# Patient Record
Sex: FEMALE | Race: BLACK OR AFRICAN AMERICAN | NOT HISPANIC OR LATINO | URBAN - METROPOLITAN AREA
[De-identification: names, ages, dates, MRNs, and addresses within clinical notes are randomized per-mention and may not be internally consistent; named-entity substitution may affect disease eponyms.]

---

## 2023-05-29 ENCOUNTER — INPATIENT (INPATIENT)
Facility: HOSPITAL | Age: 58
LOS: 2 days | Discharge: HOME | DRG: 101 | End: 2023-06-01
Attending: PSYCHIATRY & NEUROLOGY | Admitting: STUDENT IN AN ORGANIZED HEALTH CARE EDUCATION/TRAINING PROGRAM
Payer: MEDICAID

## 2023-05-29 VITALS
HEART RATE: 74 BPM | SYSTOLIC BLOOD PRESSURE: 124 MMHG | DIASTOLIC BLOOD PRESSURE: 84 MMHG | RESPIRATION RATE: 20 BRPM | OXYGEN SATURATION: 97 % | TEMPERATURE: 98 F | WEIGHT: 138.89 LBS

## 2023-05-29 DIAGNOSIS — E87.6 HYPOKALEMIA: ICD-10-CM

## 2023-05-29 DIAGNOSIS — F17.210 NICOTINE DEPENDENCE, CIGARETTES, UNCOMPLICATED: ICD-10-CM

## 2023-05-29 DIAGNOSIS — Z29.9 ENCOUNTER FOR PROPHYLACTIC MEASURES, UNSPECIFIED: ICD-10-CM

## 2023-05-29 DIAGNOSIS — J45.909 UNSPECIFIED ASTHMA, UNCOMPLICATED: ICD-10-CM

## 2023-05-29 DIAGNOSIS — I12.9 HYPERTENSIVE CHRONIC KIDNEY DISEASE WITH STAGE 1 THROUGH STAGE 4 CHRONIC KIDNEY DISEASE, OR UNSPECIFIED CHRONIC KIDNEY DISEASE: ICD-10-CM

## 2023-05-29 DIAGNOSIS — I10 ESSENTIAL (PRIMARY) HYPERTENSION: ICD-10-CM

## 2023-05-29 DIAGNOSIS — G40.909 EPILEPSY, UNSPECIFIED, NOT INTRACTABLE, WITHOUT STATUS EPILEPTICUS: ICD-10-CM

## 2023-05-29 DIAGNOSIS — R56.9 UNSPECIFIED CONVULSIONS: ICD-10-CM

## 2023-05-29 DIAGNOSIS — N18.30 CHRONIC KIDNEY DISEASE, STAGE 3 UNSPECIFIED: ICD-10-CM

## 2023-05-29 DIAGNOSIS — R00.1 BRADYCARDIA, UNSPECIFIED: ICD-10-CM

## 2023-05-29 LAB
ALBUMIN SERPL ELPH-MCNC: 3.3 G/DL — LOW (ref 3.4–5)
ALP SERPL-CCNC: 110 U/L — SIGNIFICANT CHANGE UP (ref 40–120)
ALT FLD-CCNC: 32 U/L — SIGNIFICANT CHANGE UP (ref 12–42)
ANION GAP SERPL CALC-SCNC: 4 MMOL/L — LOW (ref 9–16)
ANION GAP SERPL CALC-SCNC: 6 MMOL/L — LOW (ref 9–16)
AST SERPL-CCNC: 46 U/L — HIGH (ref 15–37)
BASOPHILS # BLD AUTO: 0.05 K/UL — SIGNIFICANT CHANGE UP (ref 0–0.2)
BASOPHILS NFR BLD AUTO: 0.9 % — SIGNIFICANT CHANGE UP (ref 0–2)
BILIRUB SERPL-MCNC: 0.6 MG/DL — SIGNIFICANT CHANGE UP (ref 0.2–1.2)
BUN SERPL-MCNC: 17 MG/DL — SIGNIFICANT CHANGE UP (ref 7–23)
BUN SERPL-MCNC: 18 MG/DL — SIGNIFICANT CHANGE UP (ref 7–23)
CALCIUM SERPL-MCNC: 8.9 MG/DL — SIGNIFICANT CHANGE UP (ref 8.5–10.5)
CALCIUM SERPL-MCNC: 9 MG/DL — SIGNIFICANT CHANGE UP (ref 8.5–10.5)
CHLORIDE SERPL-SCNC: 108 MMOL/L — SIGNIFICANT CHANGE UP (ref 96–108)
CHLORIDE SERPL-SCNC: 109 MMOL/L — HIGH (ref 96–108)
CO2 SERPL-SCNC: 32 MMOL/L — HIGH (ref 22–31)
CO2 SERPL-SCNC: 32 MMOL/L — HIGH (ref 22–31)
CREAT SERPL-MCNC: 1.05 MG/DL — SIGNIFICANT CHANGE UP (ref 0.5–1.3)
CREAT SERPL-MCNC: 1.21 MG/DL — SIGNIFICANT CHANGE UP (ref 0.5–1.3)
EGFR: 53 ML/MIN/1.73M2 — LOW
EGFR: 63 ML/MIN/1.73M2 — SIGNIFICANT CHANGE UP
EOSINOPHIL # BLD AUTO: 0.14 K/UL — SIGNIFICANT CHANGE UP (ref 0–0.5)
EOSINOPHIL NFR BLD AUTO: 2.4 % — SIGNIFICANT CHANGE UP (ref 0–6)
GLUCOSE SERPL-MCNC: 87 MG/DL — SIGNIFICANT CHANGE UP (ref 70–99)
GLUCOSE SERPL-MCNC: 94 MG/DL — SIGNIFICANT CHANGE UP (ref 70–99)
HCT VFR BLD CALC: 39.7 % — SIGNIFICANT CHANGE UP (ref 34.5–45)
HGB BLD-MCNC: 12.6 G/DL — SIGNIFICANT CHANGE UP (ref 11.5–15.5)
HIV 1 & 2 AB SERPL IA.RAPID: SIGNIFICANT CHANGE UP
IMM GRANULOCYTES NFR BLD AUTO: 0.2 % — SIGNIFICANT CHANGE UP (ref 0–0.9)
LACTATE SERPL-SCNC: 1 MMOL/L — SIGNIFICANT CHANGE UP (ref 0.5–2)
LYMPHOCYTES # BLD AUTO: 3.07 K/UL — SIGNIFICANT CHANGE UP (ref 1–3.3)
LYMPHOCYTES # BLD AUTO: 52.2 % — HIGH (ref 13–44)
MAGNESIUM SERPL-MCNC: 1.8 MG/DL — SIGNIFICANT CHANGE UP (ref 1.6–2.6)
MCHC RBC-ENTMCNC: 28.7 PG — SIGNIFICANT CHANGE UP (ref 27–34)
MCHC RBC-ENTMCNC: 31.7 GM/DL — LOW (ref 32–36)
MCV RBC AUTO: 90.4 FL — SIGNIFICANT CHANGE UP (ref 80–100)
MONOCYTES # BLD AUTO: 0.33 K/UL — SIGNIFICANT CHANGE UP (ref 0–0.9)
MONOCYTES NFR BLD AUTO: 5.6 % — SIGNIFICANT CHANGE UP (ref 2–14)
NEUTROPHILS # BLD AUTO: 2.28 K/UL — SIGNIFICANT CHANGE UP (ref 1.8–7.4)
NEUTROPHILS NFR BLD AUTO: 38.7 % — LOW (ref 43–77)
NRBC # BLD: 0 /100 WBCS — SIGNIFICANT CHANGE UP (ref 0–0)
PLATELET # BLD AUTO: 175 K/UL — SIGNIFICANT CHANGE UP (ref 150–400)
POTASSIUM SERPL-MCNC: 3.2 MMOL/L — LOW (ref 3.5–5.3)
POTASSIUM SERPL-MCNC: SIGNIFICANT CHANGE UP MMOL/L (ref 3.5–5.3)
POTASSIUM SERPL-SCNC: 3.2 MMOL/L — LOW (ref 3.5–5.3)
POTASSIUM SERPL-SCNC: SIGNIFICANT CHANGE UP MMOL/L (ref 3.5–5.3)
PROT SERPL-MCNC: 7.4 G/DL — SIGNIFICANT CHANGE UP (ref 6.4–8.2)
RBC # BLD: 4.39 M/UL — SIGNIFICANT CHANGE UP (ref 3.8–5.2)
RBC # FLD: 13.7 % — SIGNIFICANT CHANGE UP (ref 10.3–14.5)
SODIUM SERPL-SCNC: 144 MMOL/L — SIGNIFICANT CHANGE UP (ref 132–145)
SODIUM SERPL-SCNC: 147 MMOL/L — HIGH (ref 132–145)
TSH SERPL-MCNC: 1.87 UIU/ML — SIGNIFICANT CHANGE UP (ref 0.36–3.74)
WBC # BLD: 5.88 K/UL — SIGNIFICANT CHANGE UP (ref 3.8–10.5)
WBC # FLD AUTO: 5.88 K/UL — SIGNIFICANT CHANGE UP (ref 3.8–10.5)

## 2023-05-29 PROCEDURE — 99222 1ST HOSP IP/OBS MODERATE 55: CPT

## 2023-05-29 PROCEDURE — 99285 EMERGENCY DEPT VISIT HI MDM: CPT

## 2023-05-29 RX ORDER — IPRATROPIUM/ALBUTEROL SULFATE 18-103MCG
3 AEROSOL WITH ADAPTER (GRAM) INHALATION ONCE
Refills: 0 | Status: COMPLETED | OUTPATIENT
Start: 2023-05-29 | End: 2023-05-29

## 2023-05-29 RX ORDER — SODIUM CHLORIDE 9 MG/ML
1000 INJECTION INTRAMUSCULAR; INTRAVENOUS; SUBCUTANEOUS ONCE
Refills: 0 | Status: COMPLETED | OUTPATIENT
Start: 2023-05-29 | End: 2023-05-29

## 2023-05-29 RX ORDER — ENOXAPARIN SODIUM 100 MG/ML
40 INJECTION SUBCUTANEOUS EVERY 24 HOURS
Refills: 0 | Status: DISCONTINUED | OUTPATIENT
Start: 2023-05-29 | End: 2023-06-01

## 2023-05-29 RX ORDER — IPRATROPIUM/ALBUTEROL SULFATE 18-103MCG
3 AEROSOL WITH ADAPTER (GRAM) INHALATION EVERY 6 HOURS
Refills: 0 | Status: DISCONTINUED | OUTPATIENT
Start: 2023-05-29 | End: 2023-06-01

## 2023-05-29 RX ORDER — SODIUM CHLORIDE 9 MG/ML
1000 INJECTION, SOLUTION INTRAVENOUS
Refills: 0 | Status: DISCONTINUED | OUTPATIENT
Start: 2023-05-29 | End: 2023-05-31

## 2023-05-29 RX ORDER — ACETAMINOPHEN 500 MG
650 TABLET ORAL EVERY 6 HOURS
Refills: 0 | Status: DISCONTINUED | OUTPATIENT
Start: 2023-05-29 | End: 2023-06-01

## 2023-05-29 RX ORDER — POTASSIUM CHLORIDE 20 MEQ
40 PACKET (EA) ORAL EVERY 4 HOURS
Refills: 0 | Status: COMPLETED | OUTPATIENT
Start: 2023-05-29 | End: 2023-05-29

## 2023-05-29 RX ADMIN — Medication 3 MILLILITER(S): at 11:27

## 2023-05-29 RX ADMIN — Medication 40 MILLIEQUIVALENT(S): at 17:02

## 2023-05-29 RX ADMIN — ENOXAPARIN SODIUM 40 MILLIGRAM(S): 100 INJECTION SUBCUTANEOUS at 13:16

## 2023-05-29 RX ADMIN — SODIUM CHLORIDE 1000 MILLILITER(S): 9 INJECTION, SOLUTION INTRAVENOUS at 13:16

## 2023-05-29 RX ADMIN — Medication 40 MILLIEQUIVALENT(S): at 13:16

## 2023-05-29 RX ADMIN — SODIUM CHLORIDE 1000 MILLILITER(S): 9 INJECTION INTRAMUSCULAR; INTRAVENOUS; SUBCUTANEOUS at 08:27

## 2023-05-29 NOTE — ED ADULT NURSE NOTE - NS ED PATIENT SAFETY CONCERN
Unable to assess due to medical condition Meals and Snack/Nutrition Education/Collaboration and Referral of Nutrition Care

## 2023-05-29 NOTE — H&P ADULT - PROBLEM SELECTOR PLAN 1
repots hx of seizures does not know if she is currently rx meds or last time she took AEDs  - vEEG  - seizure precautions  - aspiration precautions   - Ativan for seizure> 2 min  - med rec required- pt does not know name of pharmacy

## 2023-05-29 NOTE — H&P ADULT - HISTORY OF PRESENT ILLNESS
57F PMHx HTN, Asthma, Seizures presents from [facility]-in [facility] brought in by ems after witnessed GTC-like seizure. Initially EMS called for difficulty breathing after cleaning spray triggered her to have mild difficulty breathing, but after EMS arrived she began to seize. 5mg IV midazolam was given and the seizure broke and patient was able to tell EMS that she has a history of seizures, but was not able to tell them if she is prescribed any medications for them. The staff at the drop-in [facility] were unable to provide other information. Pt knows that she is rx blood pressure meds but does not always take them, she does not know the name. Pt states she may be prescribed seizure and asthma meds but does not know last time she took them. Pt reports that she does not know the last time she was hospitalized Pt denies urinary or bowel incontence, tongue biting, falling or hitting her head. Further deines fever, chills, gait instabiliy numbness or tingling of lower extremities.

## 2023-05-29 NOTE — ED PROVIDER NOTE - CLINICAL SUMMARY MEDICAL DECISION MAKING FREE TEXT BOX
57F history of seizures biba after witnessed GTC-like seizure. Patient coming from shelter where she was staying. Initially, EMS called for difficulty breathing after cleaning spray triggered her to have mild difficulty breathing, but after EMS arrived she began to seize. 5mg IV midazolam was given and the seizure broke and patient was able to tell EMS that she has a history of seizures, but was not able to tell them if she is prescribed any medications for them. The staff at the shelter were unable to provide other information.     PE: Patient is lethargic but responsive to voice, appears postictal/sedated from benzo. Pupils 2mm reactive and round. NCAT. MMM. CTAB, regular rate. RRR. Abd soft nondistended. No external signs of trauma noted to the head or extremities.     MDM: Patient biba after witnessed seizure, no sp midazolam and postictal/sedated. No signs of trauma. May have been triggered by stress. CTAB now. Will reassess once patient able to interact and provide further history. Crack pipe found on patient.

## 2023-05-29 NOTE — ED PROVIDER NOTE - OBJECTIVE STATEMENT
57F history of seizures biba after witnessed GTC-like seizure. Patient coming from shelter where she was staying. Initially, EMS called for difficulty breathing after cleaning spray triggered her to have mild difficulty breathing, but after EMS arrived she began to seize. 5mg IV midazolam was given and the seizure broke and patient was able to tell EMS that she has a history of seizures, but was not able to tell them if she is prescribed any medications for them. The staff at the shelter were unable to provide other information.

## 2023-05-29 NOTE — H&P ADULT - NSHPPHYSICALEXAM_GEN_ALL_CORE
.  VITAL SIGNS:  T(C): 37.1 (05-29-23 @ 08:29), Max: 37.1 (05-29-23 @ 08:29)  T(F): 98.7 (05-29-23 @ 08:29), Max: 98.7 (05-29-23 @ 08:29)  HR: 60 (05-29-23 @ 08:29) (60 - 74)  BP: 153/100 (05-29-23 @ 08:29) (124/84 - 153/100)  BP(mean): --  RR: 16 (05-29-23 @ 08:29) (16 - 20)  SpO2: 97% (05-29-23 @ 08:29) (96% - 97%)  Wt(kg): --    PHYSICAL EXAM:    Constitutional: laying in bed  Head: NC/AT  Eyes: PERRL, EOMI, anicteric sclera  ENT: no nasal discharge; uvula midline, no oropharyngeal erythema or exudates; MMM  Neck: supple; no JVD or thyromegaly  Respiratory: CTA B/L; no W/R/R, no retractions  Cardiac: +S1/S2; RRR; no M/R/G; PMI non-displaced  Gastrointestinal: soft, NT/ND; no rebound or guarding; +BSx4  Back: spine midline, no bony tenderness or step-offs; no CVAT B/L  Extremities: WWP, no clubbing or cyanosis; no peripheral edema. Capillary refill <2 sec  Musculoskeletal: NROM x4; no joint swelling, tenderness or erythema  Vascular: 2+ radial, femoral, DP/PT pulses B/L  Dermatologic: skin warm, dry and intact; no rashes, wounds, or scars  Lymphatic: no submandibular or cervical LAD  Neurologic: AAOx3; CNII-XII grossly intact; no focal deficits  Psychiatric: affect and characteristics of appearance, verbalizations, behaviors are appropriate .  VITAL SIGNS:  T(C): 37.1 (05-29-23 @ 08:29), Max: 37.1 (05-29-23 @ 08:29)  T(F): 98.7 (05-29-23 @ 08:29), Max: 98.7 (05-29-23 @ 08:29)  HR: 60 (05-29-23 @ 08:29) (60 - 74)  BP: 153/100 (05-29-23 @ 08:29) (124/84 - 153/100)  BP(mean): --  RR: 16 (05-29-23 @ 08:29) (16 - 20)  SpO2: 97% (05-29-23 @ 08:29) (96% - 97%)  Wt(kg): --    PHYSICAL EXAM:    Constitutional: laying in bed  Head: NC/AT  Eyes: PERRL, EOMI, anicteric sclera  ENT: no nasal discharge; uvula midline, no oropharyngeal erythema or exudates; dry mucous membranes  Neck: supple; no JVD or thyromegaly  Respiratory: CTA B/L; no W/R/R, no retractions  Cardiac: +S1/S2; RRR; no M/R/G; PMI non-displaced  Gastrointestinal: soft, NT/ND; no rebound or guarding; +BSx4  Back: spine midline, no bony tenderness or step-offs; no CVAT B/L  Extremities: WWP, no clubbing or cyanosis; no peripheral edema. Capillary refill <2 sec  Musculoskeletal: NROM x4; no joint swelling, tenderness or erythema  Vascular: 2+ radial, femoral, DP/PT pulses B/L  Dermatologic: skin warm, dry and intact; no rashes, wounds, or scars  Lymphatic: no submandibular or cervical LAD  Neurologic: AAOx3; CNII-XII grossly intact; no focal deficits  Psychiatric: affect and characteristics of appearance, verbalizations, behaviors are appropriate

## 2023-05-29 NOTE — H&P ADULT - ASSESSMENT
57F PMHx HTN, Asthma, Seizures presents from half-way for witnessed seizure at half-way likely attributed to medication non-adherence.

## 2023-05-29 NOTE — H&P ADULT - PROBLEM SELECTOR PLAN 2
reports hx of asthma, may be COPD vs mixed as pt has 25pack yr hx   - was given duoneb x1  - duonebs prn sob/wheezing  - med rec required- pt does not know name of pharmacy reports hx of asthma, may be COPD vs mixed as pt has 25pack yr hx espcially considering HCO3 of 32 potential metabolic compensation  - was given duoneb x1  - duonebs prn sob/wheezing  - med rec required- pt does not know name of pharmacy

## 2023-05-29 NOTE — H&P ADULT - NSHPLABSRESULTS_GEN_ALL_CORE
LABS:                         12.6   5.88  )-----------( 175      ( 29 May 2023 07:01 )             39.7     05-29    147<H>  |  109<H>  |  18  ----------------------------<  94  3.2<L>   |  32<H>  |  1.21    Ca    9.0      29 May 2023 08:01  Mg     1.8     05-29    TPro  7.4  /  Alb  3.3<L>  /  TBili  0.6  /  DBili  x   /  AST  46<H>  /  ALT  32  /  AlkPhos  110  05-29                  RADIOLOGY, EKG & ADDITIONAL TESTS:

## 2023-05-29 NOTE — H&P ADULT - PROBLEM SELECTOR PLAN 3
reports hx of chronic stable HTN, does not know name of meds   - med rec required -  pt does not know name of pharmacy

## 2023-05-29 NOTE — ED PROVIDER NOTE - CARE PROVIDER_API CALL
Dana Bryan  Neurology  [address] [place] [address]  Phone: [phone]  Fax: [phone]  Follow Up Time: Routine

## 2023-05-29 NOTE — ED PROVIDER NOTE - NSFOLLOWUPINSTRUCTIONS_ED_ALL_ED_FT
Please follow up with the provider listed in the discharge papers. Be sure to look out for warnings or signs of seizure such as tongue biting, incontinence, shaking, eye rolling/ shaking, generalized shaking, twitching, and acute confusion or amnesia. If these things happen, please alert a healthcare professional immediately and/or come to the ER.     -Please seek medical attention if seizure lasts > 2 minutes, loss of consciousness, altered mental status, persistent headache or lethargy, change in seizure activity or any new or worsening medical conditions  -Activities such as swimming, bathing, outdoor activities, and sports should be done under supervision.    Please follow up with your Primary Care Physician. If you do not have a doctor, you can call our referral line ([phone]) to find a doctor that matches your insurance.     You can also follow up with clinics listed below if you do not have a doctor:  [facility]  [address] [place] [address]  Appointment Center:  [phone]    [address] [address]  Appointment Center: [phone] ([phone])

## 2023-05-29 NOTE — ED PROVIDER NOTE - PHYSICAL EXAMINATION
PE: Patient is lethargic but responsive to voice, appears postictal/sedated from benzo. Pupils 2mm reactive and round. NCAT. MMM. CTAB, regular rate. RRR. Abd soft nondistended. No external signs of trauma noted to the head or extremities.

## 2023-05-29 NOTE — ED ADULT NURSE NOTE - NSFALLRISKINTERV_ED_ALL_ED
Assistance OOB with selected safe patient handling equipment if applicable/Communicate fall risk and risk factors to all staff, patient, and family/Encourage patient to sit up slowly, dangle for a short time, stand at bedside before walking/Orthostatic vital signs/Provide visual cue: yellow wristband, yellow gown, etc/Reinforce activity limits and safety measures with patient and family/Review medications for side effects contributing to fall risk/Toileting schedule using arm’s reach rule for commode and bathroom/Call bell, personal items and telephone in reach/Instruct patient to call for assistance before getting out of bed/chair/stretcher/Non-slip footwear applied when patient is off stretcher/[place] to call system/Physically safe environment - no spills, clutter or unnecessary equipment/Purposeful Proactive Rounding/Room/bathroom lighting operational, light cord in reach

## 2023-05-29 NOTE — PATIENT PROFILE ADULT - FALL HARM RISK - HARM RISK INTERVENTIONS

## 2023-05-29 NOTE — H&P ADULT - ATTENDING COMMENTS
Decreased ADLs secondary to right total hip arthroplasty 57-year-old woman with history of seizures and asthma who yesterday had difficulty breathing while at a drop in shelter.  EMS was called for suspected asthma exacerbation and while they were there she had witnessed seizure-like activity.  She was brought to [facility] ED and then admitted to EMU.  She had another episode this morning of seizure-like activity, with rapid return to baseline.  Lactate 1.0 shortly after the episode.  Differential includes epilepsy vs. PNES.  EEG pending.

## 2023-05-29 NOTE — H&P ADULT - PROBLEM SELECTOR PLAN 4
F: none  E: replete prn  N: low sodium   DVT ppx: lovenox F: LR 100mL/hr 10 hrs  E: replete prn  N: low sodium   DVT ppx: lovenox

## 2023-05-29 NOTE — ED ADULT NURSE REASSESSMENT NOTE - NS ED NURSE REASSESS COMMENT FT1
Pt now awake, A+Ox4, able to follow commands. Awaiting transport to [facility] for admission.

## 2023-05-29 NOTE — ED ADULT TRIAGE NOTE - CHIEF COMPLAINT QUOTE
BIBA from shelter? [address], with complaints of seizure. Patient initially had staff call 911 when she began experiencing asthma symptoms after they spray lysol in her vicinity. Upon ems arrival patient began experiencing convulsive type movements of arms and legs, 5mg versed administered ivp by medic. No signs of injury noted. Patient sedated on arrival to ED.

## 2023-05-29 NOTE — ED ADULT NURSE REASSESSMENT NOTE - NS ED NURSE REASSESS COMMENT FT1
pt rec'd awake and alert- 2nd SL in place, pt on cardiac monitor and with no complaints. Comfort measures given, safety precautions in place- repeat VSS

## 2023-05-29 NOTE — ED ADULT TRIAGE NOTE - NS ED TRIAGE AVPU SCALE
Unresponsive - The patient is nonverbal and does not respond even when a painful stimulus is applied. 29

## 2023-05-30 LAB
ANION GAP SERPL CALC-SCNC: 12 MMOL/L — SIGNIFICANT CHANGE UP (ref 5–17)
BUN SERPL-MCNC: 14 MG/DL — SIGNIFICANT CHANGE UP (ref 7–23)
CALCIUM SERPL-MCNC: 9.5 MG/DL — SIGNIFICANT CHANGE UP (ref 8.4–10.5)
CHLORIDE SERPL-SCNC: 101 MMOL/L — SIGNIFICANT CHANGE UP (ref 96–108)
CO2 SERPL-SCNC: 27 MMOL/L — SIGNIFICANT CHANGE UP (ref 22–31)
CREAT SERPL-MCNC: 1.13 MG/DL — SIGNIFICANT CHANGE UP (ref 0.5–1.3)
EGFR: 57 ML/MIN/1.73M2 — LOW
GLUCOSE SERPL-MCNC: 107 MG/DL — HIGH (ref 70–99)
HCT VFR BLD CALC: 42.5 % — SIGNIFICANT CHANGE UP (ref 34.5–45)
HGB BLD-MCNC: 13.8 G/DL — SIGNIFICANT CHANGE UP (ref 11.5–15.5)
MCHC RBC-ENTMCNC: 28.7 PG — SIGNIFICANT CHANGE UP (ref 27–34)
MCHC RBC-ENTMCNC: 32.5 GM/DL — SIGNIFICANT CHANGE UP (ref 32–36)
MCV RBC AUTO: 88.4 FL — SIGNIFICANT CHANGE UP (ref 80–100)
NRBC # BLD: 0 /100 WBCS — SIGNIFICANT CHANGE UP (ref 0–0)
PLATELET # BLD AUTO: 205 K/UL — SIGNIFICANT CHANGE UP (ref 150–400)
POTASSIUM SERPL-MCNC: 3.6 MMOL/L — SIGNIFICANT CHANGE UP (ref 3.5–5.3)
POTASSIUM SERPL-SCNC: 3.6 MMOL/L — SIGNIFICANT CHANGE UP (ref 3.5–5.3)
RBC # BLD: 4.81 M/UL — SIGNIFICANT CHANGE UP (ref 3.8–5.2)
RBC # FLD: 13.6 % — SIGNIFICANT CHANGE UP (ref 10.3–14.5)
SODIUM SERPL-SCNC: 140 MMOL/L — SIGNIFICANT CHANGE UP (ref 135–145)
WBC # BLD: 5.23 K/UL — SIGNIFICANT CHANGE UP (ref 3.8–10.5)
WBC # FLD AUTO: 5.23 K/UL — SIGNIFICANT CHANGE UP (ref 3.8–10.5)

## 2023-05-30 PROCEDURE — 70450 CT HEAD/BRAIN W/O DYE: CPT | Mod: 26

## 2023-05-30 PROCEDURE — 99254 IP/OBS CNSLTJ NEW/EST MOD 60: CPT

## 2023-05-30 RX ORDER — MONTELUKAST 4 MG/1
10 TABLET, CHEWABLE ORAL DAILY
Refills: 0 | Status: DISCONTINUED | OUTPATIENT
Start: 2023-05-30 | End: 2023-06-01

## 2023-05-30 RX ORDER — AMLODIPINE BESYLATE 2.5 MG/1
5 TABLET ORAL DAILY
Refills: 0 | Status: DISCONTINUED | OUTPATIENT
Start: 2023-05-30 | End: 2023-06-01

## 2023-05-30 RX ADMIN — ENOXAPARIN SODIUM 40 MILLIGRAM(S): 100 INJECTION SUBCUTANEOUS at 12:45

## 2023-05-30 RX ADMIN — AMLODIPINE BESYLATE 5 MILLIGRAM(S): 2.5 TABLET ORAL at 16:39

## 2023-05-30 NOTE — PROGRESS NOTE ADULT - PROBLEM SELECTOR PLAN 1
repots hx of seizures does not know if she is currently rx meds or last time she took AEDs  - vEEG  - seizure precautions  - aspiration precautions   - Ativan for seizure> 2 min  - med rec required- pt does not know name of pharmacy repots hx of seizures does not know if she is currently rx meds or last time she took AEDs  - vEEG  - f/u [facility]  - seizure precautions  - aspiration precautions   - Ativan for seizure> 2 min  - med rec required- pt does not know name of pharmacy

## 2023-05-30 NOTE — CONSULT NOTE ADULT - ATTENDING COMMENTS
56 YO F with PMHx HTN, asthma, seizure disorder BIBEMS to [facility] after witnessed GTC seizure admitted to epilepsy service for further monitoring. Internal medicine consulted on 5/30 for management of elevated blood pressures and asthma, current active smoker.    - pt seen and examined with Dr. Milian  currently not taking meds, she use inhaler from her God-mother /boy friend- ( dose not want inhaler now -ask for pills  active smoker,  ( she declined offer of nicotine patch -stated nicotine can cause cancer- we tried explaining about risk of cigarette, that cigarette smoking is risk for cancer, not nicotine.= she is not interested in quitting. stated " I am not going to quit"  awake, alert, speaking full sentence.  RRR, good air entry bilaterally  no edema no tenderness.    HTN - no evidence of edema, agree with once daily monotherapy with Amlodipine 5 mg daily   monitor BP    - asthma by history -stable -  can give montelukast 10 mg per day- she declined inhaler     seizure management as per primary team.    - smoking cessation counselled- not interested, decline offer of nicotine.    thank you for allowing medicine to participate in the care, dw primary team, Dr. Milian.

## 2023-05-30 NOTE — CONSULT NOTE ADULT - ASSESSMENT
58 YO F with PMHx HTN, asthma, seizure disorder BIBEMS to [facility] after witnessed GTC seizure admitted to epilepsy service for further monitoring. Internal medicine consulted on 5/30 for management of elevated blood pressures.    #HTN  sBPs ranging 160-180s over course of admission on arrival to . Pt appears to have documented PMHx HTN but cannot recall home medications or name of pharmacy. No medications on outpatient medication review and SureScripts. Remainder of VSS. Labs remarkable for MARCIE with hypoKa to 3.2.   -     **Incomplete Note**    Recommendations are final after attending attestation 56 YO F with PMHx HTN, asthma, seizure disorder BIBEMS to [facility] after witnessed GTC seizure admitted to epilepsy service for further monitoring. Internal medicine consulted on 5/30 for management of elevated blood pressures.    #HTN  sBPs ranging 160-180s over course of admission on arrival to . Pt appears to have documented PMHx HTN but cannot recall home medications or name of pharmacy. No medications on outpatient medication review and SureScripts. Remainder of VSS. Labs remarkable for MARCIE with hypoKa to 3.2.   - Replete K   - Start Amlodipine 5mg Q24     #Asthma  Pt with hx of asthma, reports using albuterol PRN which she obtains from family members with asthma but she herself does have a prescription or regular PCP follow-up. Refuses smoking cessation. Refuses inhalers while admitted. Lungs clear on exam.   - Start Montelukast Q24 (pt requesting pills/PO instead of inhaler)   - Pt counseled on smoking cessation and offered nicotine patches but refused    Recommendations are final after attending attestation

## 2023-05-30 NOTE — CONSULT NOTE ADULT - SUBJECTIVE AND OBJECTIVE BOX
*** MEDICINE CONSULT NOTE ***    Patient is a 57y old  Female who presents with a chief complaint of Seizures (30 May 2023 07:49)    HPI (per initial H&P)  57F PMHx HTN, Asthma, Seizures presents from [facility]in [facility] brought in by ems after witnessed GTC-like seizure. Initially EMS called for difficulty breathing after cleaning spray triggered her to have mild difficulty breathing, but after EMS arrived she began to seize. 5mg IV midazolam was given and the seizure broke and patient was able to tell EMS that she has a history of seizures, but was not able to tell them if she is prescribed any medications for them. The staff at the drop-in [facility] were unable to provide other information. Pt knows that she is rx blood pressure meds but does not always take them, she does not know the name. Pt states she may be prescribed seizure and asthma meds but does not know last time she took them. Pt reports that she does not know the last time she was hospitalized Pt denies urinary or bowel incontence, tongue biting, falling or hitting her head. Further deines fever, chills, gait instabiliy numbness or tingling of lower extremities. (29 May 2023 11:00)      FAMILY HISTORY:    PAST MEDICAL & SURGICAL HISTORY:  Asthma      Seizure      HTN (hypertension)          REVIEW OF SYSTEMS: see HPI    MEDICATIONS:  MEDICATIONS  (STANDING):  enoxaparin Injectable 40 milliGRAM(s) SubCutaneous every 24 hours  lactated ringers. 1000 milliLiter(s) (1000 mL/Hr) IV Continuous <Continuous>    MEDICATIONS  (PRN):  acetaminophen     Tablet .. 650 milliGRAM(s) Oral every 6 hours PRN Temp greater or equal to 38C (100.4F), Mild Pain (1 - 3)  albuterol/ipratropium for Nebulization 3 milliLiter(s) Nebulizer every 6 hours PRN Shortness of Breath and/or Wheezing  LORazepam   Injectable 2 milliGRAM(s) IV Push every 2 hours PRN Seizure Activity>2 minutes      ALLERGIES:  Allergies    No Known Allergies    Intolerances        VITAL SIGNS:  Vital Signs Last 24 Hrs  T(C): 36.7 (30 May 2023 10:27), Max: 36.7 (30 May 2023 10:27)  T(F): 98 (30 May 2023 10:27), Max: 98 (30 May 2023 10:27)  HR: 61 (30 May 2023 10:27) (60 - 67)  BP: 168/94 (30 May 2023 10:27) (160/100 - 180/100)  BP(mean): --  RR: 20 (30 May 2023 10:27) (18 - 20)  SpO2: 98% (30 May 2023 10:27) (96% - 98%)    Parameters below as of 30 May 2023 10:27  Patient On (Oxygen Delivery Method): room air      PHYSICAL EXAM (INCOMPLETE):  Constitutional: WDWN resting comfortably in bed; NAD  Head: NC/AT  Eyes: PERRL, EOMI, anicteric sclera  ENT: no nasal discharge; uvula midline, no oropharyngeal erythema or exudates; MMM  Neck: supple; no JVD or thyromegaly  Respiratory: CTA B/L; no W/R/R, no retractions  Cardiac: +S1/S2; RRR; no M/R/G; PMI non-displaced  Gastrointestinal: abdomen soft, NT/ND; no rebound or guarding; +BSx4  Genitourinary: normal external genitalia  Back: spine midline, no bony tenderness or step-offs; no CVAT B/L  Extremities: WWP, no clubbing or cyanosis; no peripheral edema  Musculoskeletal: NROM x4; no joint swelling, tenderness or erythema  Vascular: 2+ radial, femoral, DP/PT pulses B/L  Dermatologic: skin warm, dry and intact; no rashes, wounds, or scars  Lymphatic: no submandibular or cervical LAD  Neurologic: AAOx3; CNII-XII grossly intact; no focal deficits  Psychiatric: affect and characteristics of appearance, verbalizations, behaviors are appropriate    LABS:                        12.6   5.88  )-----------( 175      ( 29 May 2023 07:01 )             39.7     05-29    147<H>  |  109<H>  |  18  ----------------------------<  94  3.2<L>   |  32<H>  |  1.21    Ca    9.0      29 May 2023 08:01  Mg     1.8     05-29    TPro  7.4  /  Alb  3.3<L>  /  TBili  0.6  /  DBili  x   /  AST  46<H>  /  ALT  32  /  AlkPhos  110  05-29            CAPILLARY BLOOD GLUCOSE              RADIOLOGY & ADDITIONAL TESTS: Reviewed.   *** MEDICINE CONSULT NOTE ***    Patient is a 57y old  Female who presents with a chief complaint of Seizures (30 May 2023 07:49)    HPI (per initial H&P)  57F PMHx HTN, Asthma, Seizures presents from [facility]in [facility] brought in by ems after witnessed GTC-like seizure. Initially EMS called for difficulty breathing after cleaning spray triggered her to have mild difficulty breathing, but after EMS arrived she began to seize. 5mg IV midazolam was given and the seizure broke and patient was able to tell EMS that she has a history of seizures, but was not able to tell them if she is prescribed any medications for them. The staff at the drop-in [facility] were unable to provide other information. Pt knows that she is rx blood pressure meds but does not always take them, she does not know the name. Pt states she may be prescribed seizure and asthma meds but does not know last time she took them. Pt reports that she does not know the last time she was hospitalized Pt denies urinary or bowel incontence, tongue biting, falling or hitting her head. Further deines fever, chills, gait instabiliy numbness or tingling of lower extremities. (29 May 2023 11:00)      FAMILY HISTORY:    PAST MEDICAL & SURGICAL HISTORY:  Asthma      Seizure      HTN (hypertension)          REVIEW OF SYSTEMS: see HPI    MEDICATIONS:  MEDICATIONS  (STANDING):  enoxaparin Injectable 40 milliGRAM(s) SubCutaneous every 24 hours  lactated ringers. 1000 milliLiter(s) (1000 mL/Hr) IV Continuous <Continuous>    MEDICATIONS  (PRN):  acetaminophen     Tablet .. 650 milliGRAM(s) Oral every 6 hours PRN Temp greater or equal to 38C (100.4F), Mild Pain (1 - 3)  albuterol/ipratropium for Nebulization 3 milliLiter(s) Nebulizer every 6 hours PRN Shortness of Breath and/or Wheezing  LORazepam   Injectable 2 milliGRAM(s) IV Push every 2 hours PRN Seizure Activity>2 minutes      ALLERGIES:  Allergies    No Known Allergies    Intolerances        VITAL SIGNS:  Vital Signs Last 24 Hrs  T(C): 36.7 (30 May 2023 10:27), Max: 36.7 (30 May 2023 10:27)  T(F): 98 (30 May 2023 10:27), Max: 98 (30 May 2023 10:27)  HR: 61 (30 May 2023 10:27) (60 - 67)  BP: 168/94 (30 May 2023 10:27) (160/100 - 180/100)  BP(mean): --  RR: 20 (30 May 2023 10:27) (18 - 20)  SpO2: 98% (30 May 2023 10:27) (96% - 98%)    Parameters below as of 30 May 2023 10:27  Patient On (Oxygen Delivery Method): room air      PHYSICAL EXAM  Constitutional: WDWN resting comfortably in bed; NAD  Eyes: PERRL, anicteric sclera  ENT: Poor dentition, dry MM  Neck: supple; no JVD   Respiratory: CTA B/L; no W/R/R, no retractions  Cardiac: +S1/S2; RRR; no M/R/G  Gastrointestinal: abdomen soft, NT/ND; no rebound or guarding; +BSx4  Extremities: WWP, no clubbing or cyanosis; no peripheral edema  Musculoskeletal: NROM x4  Vascular: 2+ radial, DP/PT pulses B/L  Neurologic: AAOx3    LABS:                        12.6   5.88  )-----------( 175      ( 29 May 2023 07:01 )             39.7     05-29    147<H>  |  109<H>  |  18  ----------------------------<  94  3.2<L>   |  32<H>  |  1.21    Ca    9.0      29 May 2023 08:01  Mg     1.8     05-29    TPro  7.4  /  Alb  3.3<L>  /  TBili  0.6  /  DBili  x   /  AST  46<H>  /  ALT  32  /  AlkPhos  110  05-29            CAPILLARY BLOOD GLUCOSE              RADIOLOGY & ADDITIONAL TESTS: Reviewed.

## 2023-05-30 NOTE — PROGRESS NOTE ADULT - PROBLEM SELECTOR PLAN 2
reports hx of asthma, may be COPD vs mixed as pt has 25pack yr hx espcially considering HCO3 of 32 potential metabolic compensation  - was given duoneb x1  - duonebs prn sob/wheezing  - med rec required- pt does not know name of pharmacy reports hx of asthma, may be COPD vs mixed as pt has 25pack yr hx especially considering HCO3 of 32 potential metabolic compensation  - was given duoneb x1  - duonebs prn sob/wheezing  - f/u med consult

## 2023-05-30 NOTE — PROGRESS NOTE ADULT - SUBJECTIVE AND OBJECTIVE BOX
INTERVAL HPI/OVERNIGHT EVENTS:  O/N:  This morning: Patient was seen and examined at bedside.      VITAL SIGNS:  T(F): 97.5 (05-30-23 @ 06:00)  HR: 60 (05-30-23 @ 06:00)  BP: 180/100 (05-30-23 @ 06:00)  RR: 18 (05-30-23 @ 06:00)  SpO2: 96% (05-30-23 @ 06:00)  Wt(kg): --    I/O:      PHYSICAL EXAM:    Constitutional: NAD  HEENT: PERRL, EOMI, sclera non-icteric, neck supple, trachea midline, no masses, no JVD, MMM, good dentition  Respiratory: CTA b/l, good air entry b/l, no wheezing, no rhonchi, no rales, without accessory muscle use and no intercostal retractions  Cardiovascular: RRR, normal S1S2, no M/R/G  Gastrointestinal: abdomen soft, NTND, no masses palpable, BS normal  Extremities: Warm, well perfused, pulses equal bilateral upper and lower extremities, no edema, no clubbing  Neurological: AAOx3, CN Grossly intact  Skin: Normal temperature, warm, dry    MEDICATIONS  (STANDING):  enoxaparin Injectable 40 milliGRAM(s) SubCutaneous every 24 hours  lactated ringers. 1000 milliLiter(s) (1000 mL/Hr) IV Continuous <Continuous>    MEDICATIONS  (PRN):  acetaminophen     Tablet .. 650 milliGRAM(s) Oral every 6 hours PRN Temp greater or equal to 38C (100.4F), Mild Pain (1 - 3)  albuterol/ipratropium for Nebulization 3 milliLiter(s) Nebulizer every 6 hours PRN Shortness of Breath and/or Wheezing  LORazepam   Injectable 2 milliGRAM(s) IV Push every 2 hours PRN Seizure Activity>2 minutes      Allergies    No Known Allergies    Intolerances        LABS:                        12.6   5.88  )-----------( 175      ( 29 May 2023 07:01 )             39.7     05-29    147<H>  |  109<H>  |  18  ----------------------------<  94  3.2<L>   |  32<H>  |  1.21    Ca    9.0      29 May 2023 08:01  Mg     1.8     05-29    TPro  7.4  /  Alb  3.3<L>  /  TBili  0.6  /  DBili  x   /  AST  46<H>  /  ALT  32  /  AlkPhos  110  05-29                  RADIOLOGY & ADDITIONAL TESTS:  Reviewed INTERVAL HPI/OVERNIGHT EVENTS:  O/N: RIYA  This morning: Patient was seen and examined at bedside.      VITAL SIGNS:  T(F): 97.5 (05-30-23 @ 06:00)  HR: 60 (05-30-23 @ 06:00)  BP: 180/100 (05-30-23 @ 06:00)  RR: 18 (05-30-23 @ 06:00)  SpO2: 96% (05-30-23 @ 06:00)  Wt(kg): --    I/O:      PHYSICAL EXAM:    Constitutional: NAD  HEENT: EOMI, sclera non-icteric, neck supple, MMM, good dentition  Respiratory: CTA b/l, good air entry b/l, no wheezing, no rhonchi, no rales, without accessory muscle use and no intercostal retractions  Cardiovascular: RRR, normal S1S2, no M/R/G  Gastrointestinal: abdomen soft, NTND, no masses palpable, BS normal  Extremities: Warm, well perfused, pulses equal bilateral upper and lower extremities, no edema, no clubbing  Neurological: R hand tremor. AAOx3, CN Grossly intact  Skin: Normal temperature, warm, dry    MEDICATIONS  (STANDING):  enoxaparin Injectable 40 milliGRAM(s) SubCutaneous every 24 hours  lactated ringers. 1000 milliLiter(s) (1000 mL/Hr) IV Continuous <Continuous>    MEDICATIONS  (PRN):  acetaminophen     Tablet .. 650 milliGRAM(s) Oral every 6 hours PRN Temp greater or equal to 38C (100.4F), Mild Pain (1 - 3)  albuterol/ipratropium for Nebulization 3 milliLiter(s) Nebulizer every 6 hours PRN Shortness of Breath and/or Wheezing  LORazepam   Injectable 2 milliGRAM(s) IV Push every 2 hours PRN Seizure Activity>2 minutes      Allergies    No Known Allergies    Intolerances        LABS:                        12.6   5.88  )-----------( 175      ( 29 May 2023 07:01 )             39.7     05-29    147<H>  |  109<H>  |  18  ----------------------------<  94  3.2<L>   |  32<H>  |  1.21    Ca    9.0      29 May 2023 08:01  Mg     1.8     05-29    TPro  7.4  /  Alb  3.3<L>  /  TBili  0.6  /  DBili  x   /  AST  46<H>  /  ALT  32  /  AlkPhos  110  05-29                  RADIOLOGY & ADDITIONAL TESTS:  Reviewed

## 2023-05-30 NOTE — PROGRESS NOTE ADULT - ASSESSMENT
57F PMHx HTN, Asthma, Seizures presents from residential for witnessed seizure at residential likely attributed to medication non-adherence.

## 2023-05-31 ENCOUNTER — TRANSCRIPTION ENCOUNTER (OUTPATIENT)
Age: 58
End: 2023-05-31

## 2023-05-31 LAB
ANION GAP SERPL CALC-SCNC: 10 MMOL/L — SIGNIFICANT CHANGE UP (ref 5–17)
BUN SERPL-MCNC: 20 MG/DL — SIGNIFICANT CHANGE UP (ref 7–23)
CALCIUM SERPL-MCNC: 9.8 MG/DL — SIGNIFICANT CHANGE UP (ref 8.4–10.5)
CHLORIDE SERPL-SCNC: 104 MMOL/L — SIGNIFICANT CHANGE UP (ref 96–108)
CO2 SERPL-SCNC: 27 MMOL/L — SIGNIFICANT CHANGE UP (ref 22–31)
CREAT SERPL-MCNC: 1.25 MG/DL — SIGNIFICANT CHANGE UP (ref 0.5–1.3)
EGFR: 51 ML/MIN/1.73M2 — LOW
GLUCOSE BLDC GLUCOMTR-MCNC: 123 MG/DL — HIGH (ref 70–99)
GLUCOSE SERPL-MCNC: 98 MG/DL — SIGNIFICANT CHANGE UP (ref 70–99)
HCT VFR BLD CALC: 44 % — SIGNIFICANT CHANGE UP (ref 34.5–45)
HGB BLD-MCNC: 14.4 G/DL — SIGNIFICANT CHANGE UP (ref 11.5–15.5)
MAGNESIUM SERPL-MCNC: 2 MG/DL — SIGNIFICANT CHANGE UP (ref 1.6–2.6)
MCHC RBC-ENTMCNC: 28.9 PG — SIGNIFICANT CHANGE UP (ref 27–34)
MCHC RBC-ENTMCNC: 32.7 GM/DL — SIGNIFICANT CHANGE UP (ref 32–36)
MCV RBC AUTO: 88.4 FL — SIGNIFICANT CHANGE UP (ref 80–100)
NRBC # BLD: 0 /100 WBCS — SIGNIFICANT CHANGE UP (ref 0–0)
PHOSPHATE SERPL-MCNC: 4.8 MG/DL — HIGH (ref 2.5–4.5)
PLATELET # BLD AUTO: 200 K/UL — SIGNIFICANT CHANGE UP (ref 150–400)
POTASSIUM SERPL-MCNC: 3.4 MMOL/L — LOW (ref 3.5–5.3)
POTASSIUM SERPL-SCNC: 3.4 MMOL/L — LOW (ref 3.5–5.3)
RBC # BLD: 4.98 M/UL — SIGNIFICANT CHANGE UP (ref 3.8–5.2)
RBC # FLD: 13.3 % — SIGNIFICANT CHANGE UP (ref 10.3–14.5)
SODIUM SERPL-SCNC: 141 MMOL/L — SIGNIFICANT CHANGE UP (ref 135–145)
WBC # BLD: 4.65 K/UL — SIGNIFICANT CHANGE UP (ref 3.8–10.5)
WBC # FLD AUTO: 4.65 K/UL — SIGNIFICANT CHANGE UP (ref 3.8–10.5)

## 2023-05-31 PROCEDURE — 70551 MRI BRAIN STEM W/O DYE: CPT | Mod: 26

## 2023-05-31 PROCEDURE — 99232 SBSQ HOSP IP/OBS MODERATE 35: CPT

## 2023-05-31 PROCEDURE — 76377 3D RENDER W/INTRP POSTPROCES: CPT | Mod: 26

## 2023-05-31 RX ORDER — MONTELUKAST 4 MG/1
1 TABLET, CHEWABLE ORAL
Qty: 0 | Refills: 0 | DISCHARGE
Start: 2023-05-31

## 2023-05-31 RX ORDER — AMLODIPINE BESYLATE 2.5 MG/1
1 TABLET ORAL
Qty: 0 | Refills: 0
Start: 2023-05-31

## 2023-05-31 RX ORDER — POTASSIUM CHLORIDE 20 MEQ
40 PACKET (EA) ORAL ONCE
Refills: 0 | Status: COMPLETED | OUTPATIENT
Start: 2023-05-31 | End: 2023-05-31

## 2023-05-31 RX ADMIN — Medication 40 MILLIEQUIVALENT(S): at 19:16

## 2023-05-31 RX ADMIN — ENOXAPARIN SODIUM 40 MILLIGRAM(S): 100 INJECTION SUBCUTANEOUS at 12:16

## 2023-05-31 RX ADMIN — AMLODIPINE BESYLATE 5 MILLIGRAM(S): 2.5 TABLET ORAL at 05:37

## 2023-05-31 RX ADMIN — MONTELUKAST 10 MILLIGRAM(S): 4 TABLET, CHEWABLE ORAL at 12:16

## 2023-05-31 NOTE — PROGRESS NOTE ADULT - PROBLEM SELECTOR PROBLEM 2
Patient:   ARTHUR TOLENTINO            MRN: [id number]            FIN: [id number]               Age:   88 years     Sex:  FEMALE     :  32   Associated Diagnoses:   None   Author:   MALIK HILTON, LAUREN Miranda Complaint   fever today      Basic Information   History source: Family.   Vital signs: Vital Signs   18 17:35 Temperature - VS 36.8 deg_C  Normal    Temperature Source - VS Oral    Heart/Pulse Rate 70  Normal    Pulse Source Monitor    Respiration Rate 18 breaths/min  HI    SpO2 97 %  Normal    NIBP Systolic 179  HI    NIBP Diastolic 72  Normal    NIBP Source Right Arm    NIBP   HI   18 17:00 Heart/Pulse Rate 84  Normal    Respiration Rate 18 breaths/min  HI    SpO2 98 %  Normal    NIBP Systolic 159  HI    NIBP Diastolic 71  Normal    NIBP   Normal   18 11:51 Temperature - VS 37.3 deg_C  Normal    Temperature Source - VS Oral    Heart/Pulse Rate 94  HI    Pulse Source Monitor    Respiration Rate 18 breaths/min  HI    SpO2 96 %  Normal    NIBP Systolic 92  Normal    NIBP Diastolic 61  Normal    NIBP Source Right Arm    NIBP MAP 71  Normal   .   Medications:  (Selected)   Inpatient Medications  Ordered  ALPRAZolam oral 0.25 mg tablet: 0.25 mg, 1 tab, Oral, Daily  acetaminophen (Tylenol).: 650 mg, 2 tab, Oral, Q6H, PRN: pain mild  acetaminophen-hydrocodone oral 325-10 mg tablet (Norco).: 1 tab, Oral, Q6H, PRN: pain moderate  amLODIPine oral 5 mg tablet: 5 mg, 1 tab, Oral, Daily  cefTRIAXone  IV injection (Rocephin): 1,000 mg, Slow IV Push, Daily  dextrose (glucose) injection 50%.: 12.5 gm, 25 mL, IV Push, As Directed PRN, PRN: low blood glucose  dextrose (glucose) oral.: 15 gm, Oral, As Directed PRN, PRN: low blood glucose  docusate-senna oral 50-8.6 mg tablet (Senokot S).: 1 tab, Oral, Q Bedtime, PRN: constipation  glucagon (GlucaGen).: 1 mg, 1 mL, IM, As Directed PRN, PRN: low blood glucose  heparin subcutaneous injection 5,000 unit: 5,000 unit, Subcutaneous, 
Q8H  insulin lispro (HumaLOG) dose correction.: 1-6 units, Subcutaneous, QID [with meals & HS]  magnesium sulfate  IVPB: 2 gm, 50 mL, 25 mL/hr, IVPB, Once (scheduled)  metFORMIN oral 500 mg ER tablet: 500 mg, 1 tab, Oral, BID  ondansetron (Zofran).: 4 mg, 2 mL, Slow IV Push, Q8H, PRN: nausea  zolpidem (Ambien).: 5 mg, 1 tab, Oral, Q Bedtime, PRN: sleep  Prescriptions  Prescribed  amLODIPine oral 5 mg tablet: 5 mg, 1 tab, Oral, Daily, 30 tab, 0 Refill(s)  lisinopril oral 5 mg tablet: 2.5 mg, 0.5 tab, Oral, Daily, 15 tab, 0 Refill(s)  Documented Medications  Documented  ALPRAZolam oral 0.25 mg tablet: 0.25 mg, 1 tab, Oral, Daily  metFORMIN oral 500 mg ER tablet: 500 mg, 1 tab, Oral, BID  nitrofurantoin macrocrystals oral 100 mg MCR capsule: 100 mg, 1 cap, Oral, BID.   Allergies: No known allergies.   Immunizations: Per nurse's notes.   History limitation: Clinical condition.      History of Present Illness   The patient presents with.     88 F with severe Dementia and hearing impairment too, HTN, NIDDM that was diagnosed with UTI E coli 4 days ago in clinic.  Today her daughter noticed her to have fever so brought her to ED. No chills.  No cough, Had diarhrea last week which slef resolved.  PAtient has no complaint and does not contribute in interview other than allowing exam and answers her name and No. Ambulating with walker until some days ago      Review of Systems   Constitutional symptoms:  No fever, no chills, no sweats.    Eye symptoms:  Negative except as documented in HPI,  .    ENMT symptoms:  Negative except as documented in HPI,  .    Cardiovascular symptoms:  Negative except as documented in HPI,  .    Respiratory symptoms:  Negative except as documented in HPI,  .    Gastrointestinal symptoms:  Negative except as documented in HPI,  .    Genitourinary symptoms:  Negative except as documented in HPI,  .    Musculoskeletal symptoms: Negative except as documented in HPI.   Skin symptoms:  Negative except 
as documented in HPI,  .    Hematologic/Lymphatic symptoms:  Negative except as documented in HPI,  .    Neurologic symptoms:  Negative except as documented in HPI,  .    Endocrine symptoms:  Negative except as documented in HPI,  .    Allergy/Immunologic symptoms:  Negative except as documented in HPI,  .    Psychiatric symptoms:   .             Additional review of systems information: All other systems reviewed and otherwise negative.      Past Medical/ Family/ Social History   Medical history: CVA (cerebral vascular accident)  Dementia  Diabetes  H/O: blood transfusion  Hypertension  Kidney stones  Pacemaker   .   Surgical history:    Hysterectomy (738384693).  Knee joint operation (6648329744).  Pacemaker care assessment ([id number]).,  .   Family history: Unknown.   Social history:    Alcohol  Details: Use: None.  Frequency: per daughter, pt. does not drink alcohol.  Blackouts: Denies.  Change in Tolerance: Denies.  Loss of Control: Denies.  Exercise  Details: Excercise: Never.  Home/Environment  Details: Alcohol Abuse in Household: No.  Substance Abuse in Household: No.  Smoker in Household: No.  Patient Lives With: Daughter.  Ambulation: Total Assist.  Bathing: Total Assist.  Dressing: Total Assist.  Driving: Not Performed.  Eating: Required Assistance.  Elimination: 1 - Total Assistance.  Grooming: Total Assist.  Preparing Meal: Not Performed.  Taking Meds: Total Assist.  Toileting: Required Assistance.  Transfers: Required Assistance.  Assistive Devices Home: Walker.  Details: Alcohol abuse in household: No.  Substance abuse in household: No.  Smoker in household: No.  Substance Abuse  Details: None  Tobacco  Details: No, Never smoker  Cultural/Nondenominational Practices  Details: Nondenominational or Cultural Practices While in Hospital: No.  .   Problem list:    CVA (cerebral vascular accident)  Dementia  Diabetes  H/O: blood transfusion  Hypertension  Kidney stones  Pacemaker  .   Allergies (1) 
Active Reaction  NKA None Documented      Home Medications (5) Active  ALPRAZolam oral 0.25 mg tablet 0.25 mg = 1 tab, Oral, Daily  amLODIPine oral 5 mg tablet 5 mg = 1 tab, Oral, Daily  lisinopril oral 5 mg tablet 2.5 mg = 0.5 tab, Oral, Daily  metFORMIN oral 500 mg ER tablet 500 mg = 1 tab, Oral, BID  nitrofurantoin macrocrystals oral 100 mg MCR capsule 100 mg = 1 cap, Oral, BID        Vitals between:   20-APR-2018 18:18:40   TO   21-APR-2018 18:18:40                   LAST RESULT MINIMUM MAXIMUM  Temperature 36.8 36.8 37.3  Heart Rate 70 70 94  Respiratory Rate 18 18 18  NISBP           179 92 179  NIDBP           72 61 72  NIMBP           108 71 108  SpO2                    97 96 98         Physical Examination   General:  Alert,  .    Skin:  Pink, moist, no rash,  .    Head:  Normocephalic,  .    Neck:  Supple, trachea midline, no tenderness, no JVD,  .    Eye:  Pupils are equal, round and reactive to light, normal conjunctiva,  .    Ears, nose, mouth and throat:  Oral mucosa moist, no pharyngeal erythema or exudate,  .    Cardiovascular:  Regular rate and rhythm,  .    Respiratory:  Lungs are clear to auscultation,  .    Gastrointestinal:  Soft, Nontender, Non distended,  .    Genitourinary:  No tenderness,  .    Back:  Nontender,  .    Musculoskeletal:  Normal ROM, normal strength,  .    Neurological:  No focal neurological deficit observed, normal sensory observed, normal motor observed, normal speech observed, normal coordination observed, oreinted in person, Not Alert and oriented to person, place, time, and situation,    Psychiatric:  Cooperative, appropriate mood & affect, normal judgment, non-suicidal,  .    Lymphatics:  No lymphadenopathy,  .       Medical Decision Making     Labs between:  20-APR-2018 18:18 to 21-APR-2018 18:18    CBC:                 WBC  HgB  Hct  Plt  MCV  RDW   21-APR-2018 9.7  (L) 11.6  36.5  146  80.9  12.4     DIFF:                 Seg  Neutroph//ABS  Lymph//ABS  Mono//ABS 
 EOS/ABS   21-APR-2018 NOT APPLICABLE  82 // (H) 8.0  11 // 1.1 6 // 0.6 0 // (L) 0.0     BMP:                 Na  Cl  BUN  Glu   21-APR-2018 (L) 134  99  16  (H) 246                              K  CO2  Cr  Ca                              3.5  26  (H) 1.21  8.7     CMP:                 AST  ALT  AlkPhos  Bili  Albumin   21-APR-2018 13  16  80  (H) 2.6  (L) 3.1     Other Chem:             Mg  Phos  Triglycerides  GGTP  DirectBili                           (L) 1.6             POC GLU:                 Latest Result  Latest Date  Minimum  Min Date  Maximum  Max Date                             (H) 168  21-APR-2018 (H) 168  21-APR-2018 (H) 168                 COAG:                 INR  PT  PTT  Ddimer  Fibrinogen    21-APR-2018 1.1  11.0  29                        Result title:  US GALLBLADDER  Result status:  Final  Verified by:  LAURA MEHTA on 04/21/2018   IMPRESSION: Study limited by bowel gas and patient's clinical condition.Gallstones.  No associated gallbladder wall thickening or bile duct dilatation.    Result title:  US GALLBLADDER  Result status:  Final  Verified by:  LAURA MEHTA on 04/21/2018   FINDINGS:Liver measures 14.8 cm in length.  No obvious space-occupying lesions.Gallstones identified.  No associated gallbladder wall thickening or bile duct dilatation.The pancreas is poorly visualized because of obscuring gas.  Negative Pascual's sign  IMPRESSION: Study limited by bowel gas and patient's clinical condition.Gallstones.  No associated gallbladder wall thickening or bile duct dilatation.        Orders    Result title:  XR CHEST 1V  Result status:  Final  Verified by:  LAURA MEHTA on 04/21/2018   IMPRESSION: Dual-chamber pacemaker in place.  Mild bilateral congestive changes.  Rule out minimal bilateral pleural effusion as well.   ,    .            Result title:  XR CHEST 1V  Result status:  Final  Verified by:  LAURA MEHTA on 04/21/2018   IMPRESSION: Dual-chamber pacemaker in 
Asthma
place.  Mild bilateral congestive changes.  Rule out minimal bilateral pleural effusion as well.         Impression and Plan   Fever with history of recent UTI.  She has no complaint.  Her exam is negative but her dementia.  CXR with R pleural effusion  Plan Treat as UTI and CAP  hypotension resolved with IVF in ED and actually hypertension which point to hypovolemia more than sepsis  hypomagnesemia replace  hypovolemeic hyponatremia s/p IVF    NIDDM with hyperglycemia add DM protocol and supplemental lispro  CKD stage III creat actually better than prior     troponin elevation w/o CP ECG LAFH and RBBB. She lacks CV symptoms, has advanced dementia. Discussed with her family.  No plans to purse this further. Family agreable Troponin elevation attributed to hypotension.    VTE prophy with UFH    HTN restart amlodipine    FULL LET                 Electronically Signed On 04/21/2018 18:29  __________________________________________________   LAUREN GANDARA      Electronically Signed On 04/21/2018 18:29  __________________________________________________   LAUREN GANDARA    
Asthma

## 2023-05-31 NOTE — PROGRESS NOTE ADULT - PROBLEM SELECTOR PLAN 3
reports hx of chronic stable HTN, does not know name of meds   - c/w amlodipine 5 mg PO daily
reports hx of chronic stable HTN, does not know name of meds   - f/u med consult

## 2023-05-31 NOTE — PROGRESS NOTE ADULT - ATTENDING COMMENTS
58 YO F with PMHx HTN, asthma, seizure disorder BIBEMS to [facility] after witnessed GTC seizure admitted to epilepsy service for further monitoring. Internal medicine consulted on 5/30 for management of elevated blood pressures and asthma, current active smoker.    - pt seen and examined with Dr. Milian 10:30 am.  HR: 67 (31 May 2023 11:32) (60 - 67)  BP: 148/91 (31 May 2023 11:32) (143/91 - 190/111)  appear comfortable, stated feeling well  good air entry bilaterally.   no edema on bilateral lower ext.       HTN - no evidence of edema, agree with once daily monotherapy with Amlodipine 5 mg daily   bp acceptable range, would continue with amlodipine 5 mg daily for now.   ekg 5/29- sinus latasha at 58 bpm.     - asthma by history -stable -  can give montelukast 10 mg per day- she declined inhaler     - GFR is around 50 - would be considered as CKD stage 3 -  could be related to hypertension.    seizure management as per primary team.    - smoking cessation counselled- not interested, decline offer of nicotine.    thank you for allowing medicine to participate in the care, dw Dr. Milian.  she requested prescription be sent to pharmacy at [address] [place] [address] and [facility] ( do not remember the name )   no pmd, would benefit from establishing pmd .

## 2023-05-31 NOTE — PROGRESS NOTE ADULT - ASSESSMENT
57F PMHx HTN, Asthma, Seizures presents from [facility] for witnessed seizure at [facility] likely attributed to medication non-adherence.

## 2023-05-31 NOTE — PROGRESS NOTE ADULT - ASSESSMENT
58 YO F with PMHx HTN, asthma, seizure disorder BIBEMS to [facility] after witnessed GTC seizure admitted to epilepsy service for further monitoring. Internal medicine consulted on 5/30 for management of elevated blood pressures.    #HTN  sBPs ranging 160-180s over course of admission on arrival to . Pt appears to have documented PMHx HTN but cannot recall home medications or name of pharmacy. No medications on outpatient medication review and SureScripts. Remainder of VSS. Labs remarkable for MARCIE with hypoKa to 3.2.   - Replete K   - Continue Amlodipine 5mg Q24     #Asthma  Pt with hx of asthma, reports using albuterol PRN which she obtains from family members with asthma but she herself does have a prescription or regular PCP follow-up. Refuses smoking cessation. Refuses inhalers while admitted. Lungs clear on exam.   - Continue Montelukast Q24 (pt requesting pills/PO instead of inhaler)   - Pt counseled on smoking cessation and offered nicotine patches but refused    Recommendations are final after attending attestation    **Incomplete Note**  **Incomplete Note**  **Incomplete Note** 56 YO F with PMHx HTN, asthma, seizure disorder BIBEMS to [facility] after witnessed GTC seizure admitted to epilepsy service for further monitoring. Internal medicine consulted on 5/30 for management of elevated blood pressures.    #HTN  sBPs ranging 160-180s over course of admission on arrival to . Pt appears to have documented PMHx HTN but cannot recall home medications or name of pharmacy. No medications on outpatient medication review and SureScripts. Remainder of VSS. Amlodipine 5mg Q24 started on 5/30.   - Replete K   - Continue Amlodipine 5mg Q24     #Asthma  Pt with hx of asthma, reports using albuterol PRN which she obtains from family members with asthma but she herself does have a prescription or regular PCP follow-up. Refuses smoking cessation. Refuses inhalers while admitted. Lungs clear on exam.   - Continue Montelukast Q24 (pt requesting pills/PO instead of inhaler)   - Pt counseled on smoking cessation and offered nicotine patches but refused    Recommendations are final after attending attestation    **Incomplete Note**  **Incomplete Note**  **Incomplete Note** 58 YO F with PMHx HTN, asthma, seizure disorder BIBEMS to [facility] after witnessed GTC seizure admitted to epilepsy service for further monitoring. Internal medicine consulted on 5/30 for management of elevated blood pressures.    #HTN  sBPs ranging 160-180s over course of admission on arrival to . Pt appears to have documented PMHx HTN but cannot recall home medications or name of pharmacy. No medications on outpatient medication review and SureScripts. Remainder of VSS. Amlodipine 5mg Q24 started on 5/30. AM /91.  - Replete K   - Continue Amlodipine 5mg Q24   - For future prescriptions, pt would prefer scripts at pharmacy located on [address] [place] [address] (does not recall name)    #Asthma  Pt with hx of asthma, reports using albuterol PRN which she obtains from family members with asthma but she herself does have a prescription or regular PCP follow-up. Refuses smoking cessation. Refuses inhalers while admitted. Lungs clear on exam.   - Continue Montelukast Q24 (pt requesting pills/PO instead of inhaler)   - Pt counseled on smoking cessation and offered nicotine patches but refused    Recommendations are final after attending attestation.

## 2023-05-31 NOTE — DISCHARGE NOTE PROVIDER - NSDCCPCAREPLAN_GEN_ALL_CORE_FT
PRINCIPAL DISCHARGE DIAGNOSIS  Diagnosis: Seizure  Assessment and Plan of Treatment: You were admitted to the hospital for seizure monitoring. No seizure activity was detected on the video EEG. Please follow-up with a neurologist upon discharge.      SECONDARY DISCHARGE DIAGNOSES  Diagnosis: Asthma  Assessment and Plan of Treatment: Please take montelukast 10 mg daily for your asthma.    Diagnosis: HTN (hypertension)  Assessment and Plan of Treatment: Please take amlodipine 5 mg daily for your high blood pressure.     PRINCIPAL DISCHARGE DIAGNOSIS  Diagnosis: Seizure  Assessment and Plan of Treatment: You were admitted to the hospital for seizure monitoring. No seizure activity was detected on the video EEG. Please follow-up with a neurologist upon discharge.      SECONDARY DISCHARGE DIAGNOSES  Diagnosis: Asthma  Assessment and Plan of Treatment: Please take montelukast 10 mg daily for your asthma.    Diagnosis: HTN (hypertension)  Assessment and Plan of Treatment: We started you on a medication called amlodipine for your high blood pressure. Please take amlodipine 5 mg daily and follow-up with a primary care provider soon after discharge.

## 2023-05-31 NOTE — DISCHARGE NOTE PROVIDER - HOSPITAL COURSE
#Discharge: do not delete    57F PMHx HTN, Asthma, Seizures presents from [facility] for witnessed seizure at [facility] likely attributed to medication non-adherence.     Problem List/Main Diagnoses (system-based):  1. Seizure: Pt reports history of seizures, does not know if she is currently prescribed AEDs or the last time she took them.   - CT head negative   - Monitored on vEEG with no evidence of seizure activity while inpatient   - Pt is not interested in taking AEDs     2. Asthma: Pt with hx of asthma, reports using albuterol PRN which she obtains from family members with asthma but she herself does have a prescription or regular PCP follow-up. Refuses smoking cessation. Refuses inhalers while admitted. Lungs clear on exam.   - Continue Montelukast Q24 (pt requesting pills/PO instead of inhaler)   - Pt counseled on smoking cessation and offered nicotine patches but refused    3. HTN: Pt has documented history of HTN but cannot recall home medications or name of pharmacy. No medications on outpatient medication review and SureScripts.   - Started amlodipine 5 mg daily       Patient was discharged to: Shelter          New medications: Montelukast 10 mg PO daily, amlodipine 5 mg PO daily    Changes to old medications: None    Medications that were stopped: None         Items to follow up as outpatient: Neurology, PCP          Physical exam at the time of discharge:  Constitutional: WDWN resting comfortably in bed; NAD. On EEG.  Eyes: PERRL, anicteric sclera  ENT: Poor dentition, dry MM  Neck: supple; no JVD   Respiratory: CTA B/L; no W/R/R, no retractions  Cardiac: +S1/S2; RRR; no M/R/G  Gastrointestinal: abdomen soft, NT/ND; no rebound or guarding; +BSx4  Extremities: WWP, no clubbing or cyanosis; no peripheral edema  Musculoskeletal: NROM x4  Vascular: 2+ radial, DP/PT pulses B/L  Neurologic: AAOx3 #Discharge: do not delete    57F PMHx HTN, Asthma, Seizures presents from [facility] for witnessed seizure at [facility] likely attributed to medication non-adherence.     Problem List/Main Diagnoses (system-based):  1. Seizure: Pt reports history of seizures, does not know if she is currently prescribed AEDs or the last time she took them.   - CT head negative   - MRI brain showed minimum small vessel ischemic and lacunar disease, otherwise unremarkable   - Monitored on vEEG with no evidence of seizure activity while inpatient   - f/u with Neurology as outpatient     2. Asthma: Pt with hx of asthma, reports using albuterol PRN which she obtains from family members with asthma but she herself does have a prescription or regular PCP follow-up. Refuses smoking cessation. Refuses inhalers while admitted. Lungs clear on exam.   - Continue montelukast 10 mg PO daily   - Pt counseled on smoking cessation and offered nicotine patches but refused    3. HTN: Pt has documented history of HTN but cannot recall home medications or name of pharmacy. No medications on outpatient medication review and SureScripts.   - Started amlodipine 5 mg daily       Patient was discharged to: Shelter          New medications: Montelukast 10 mg PO daily, amlodipine 5 mg PO daily    Changes to old medications: None    Medications that were stopped: None         Items to follow up as outpatient: Neurology, PCP          Physical exam at the time of discharge:  Constitutional: WDWN resting comfortably in bed; NAD. On EEG.  Eyes: PERRL, anicteric sclera  ENT: Poor dentition, dry MM  Neck: supple; no JVD   Respiratory: CTA B/L; no W/R/R, no retractions  Cardiac: +S1/S2; RRR; no M/R/G  Gastrointestinal: abdomen soft, NT/ND; no rebound or guarding; +BSx4  Extremities: WWP, no clubbing or cyanosis; no peripheral edema  Musculoskeletal: NROM x4  Vascular: 2+ radial, DP/PT pulses B/L  Neurologic: AAOx3

## 2023-05-31 NOTE — PROGRESS NOTE ADULT - PROBLEM SELECTOR PLAN 4
F: PO  E: replete prn  N: low sodium   GI ppx: None  DVT ppx: Lovenox  Code status: Full code  Dispo: 7W
F: LR 100mL/hr 10 hrs  E: replete prn  N: low sodium   DVT ppx: lovenox

## 2023-05-31 NOTE — DISCHARGE NOTE NURSING/CASE MANAGEMENT/SOCIAL WORK - NSDCPEFALRISK_GEN_ALL_CORE
For information on Fall & Injury Prevention, visit: [email or web address] OR  [email or web address] OR  https://www.cdc.gov/steadi/patient.html

## 2023-05-31 NOTE — DISCHARGE NOTE PROVIDER - CARE PROVIDER_API CALL
Cinthya Vegas  Neurology  [address]  [place] [address]  Phone: [phone]  Fax: [phone]  Established Patient  Follow Up Time: 2 weeks    Jaxon Parson)  Internal Medicine  [address], [address]nd [place] [address]  Phone: [phone]  Fax: [phone]  Established Patient  Follow Up Time: 2 weeks

## 2023-05-31 NOTE — PROGRESS NOTE ADULT - PROBLEM SELECTOR PLAN 2
reports hx of asthma, may be COPD vs mixed as pt has 25pack yr hx especially considering HCO3 of 32 potential metabolic compensation  - duonebs prn sob/wheezing  - c/w montelukast 10 mg PO daily

## 2023-05-31 NOTE — DISCHARGE NOTE PROVIDER - PROVIDER TOKENS
PROVIDER:[TOKEN:[[id number]],FOLLOWUP:[2 weeks],ESTABLISHEDPATIENT:[T]],PROVIDER:[TOKEN:[[id number]],FOLLOWUP:[2 weeks],ESTABLISHEDPATIENT:[T]]

## 2023-05-31 NOTE — PROGRESS NOTE ADULT - SUBJECTIVE AND OBJECTIVE BOX
Patient is a 57y old  Female who presents with a chief complaint of Seizures (31 May 2023 15:17)      INTERVAL HPI/OVERNIGHT EVENTS:  Patient seen and examined at bedside. No acute events overnight. This morning, pt in NAD, with no acute medical complaints. Denies fever, chills, HA, dizziness, CP, SOB, palpitations, abdominal pain, n/v, changes in bowel/urinary habits, numbness, or tingling.      FAMILY HISTORY:      VITAL SIGNS:  T(C): 36.7 (05-31-23 @ 11:32), Max: 36.7 (05-31-23 @ 11:32)  HR: 67 (05-31-23 @ 11:32) (60 - 67)  BP: 148/91 (05-31-23 @ 11:32) (143/91 - 190/111)  RR: 20 (05-31-23 @ 11:32) (18 - 20)  SpO2: 98% (05-31-23 @ 11:32) (98% - 99%)  Wt(kg): --Vital Signs Last 24 Hrs  T(C): 36.7 (31 May 2023 11:32), Max: 36.7 (31 May 2023 11:32)  T(F): 98 (31 May 2023 11:32), Max: 98 (31 May 2023 11:32)  HR: 67 (31 May 2023 11:32) (60 - 67)  BP: 148/91 (31 May 2023 11:32) (143/91 - 190/111)  BP(mean): --  RR: 20 (31 May 2023 11:32) (18 - 20)  SpO2: 98% (31 May 2023 11:32) (98% - 99%)    Parameters below as of 31 May 2023 11:32  Patient On (Oxygen Delivery Method): room air      No Known Allergies      PHYSICAL EXAM:  Constitutional: NAD  HEENT: EOMI, sclera non-icteric, neck supple, MMM, good dentition  Respiratory: CTA b/l, good air entry b/l, no wheezing, no rhonchi, no rales, without accessory muscle use and no intercostal retractions  Cardiovascular: RRR, normal S1S2, no M/R/G  Gastrointestinal: abdomen soft, NTND, no masses palpable, BS normal  Extremities: Warm, well perfused, pulses equal bilateral upper and lower extremities, no edema, no clubbing  Neurological: R hand tremor. AAOx3, CN Grossly intact  Skin: Normal temperature, warm, dry    Consultant(s) Notes Reviewed:  [x ] YES  [ ] NO  Care Discussed with Consultants/Other Providers [ x] YES  [ ] NO    LABS:                        14.4   4.65  )-----------( 200      ( 31 May 2023 05:30 )             44.0     05-31    141  |  104  |  20  ----------------------------<  98  3.4<L>   |  27  |  1.25    Ca    9.8      31 May 2023 05:30  Phos  4.8     05-31  Mg     2.0     05-31          CAPILLARY BLOOD GLUCOSE      POCT Blood Glucose.: 123 mg/dL (31 May 2023 12:37)                          I & O Summary:      Microbiology:        RADIOLOGY, EKG AND ADDITIONAL TESTS: Reviewed.      RADIOLOGY & ADDITIONAL TESTS:    Imaging Personally Reviewed:  [ ] YES  [ ] NO  acetaminophen     Tablet .. 650 milliGRAM(s) Oral every 6 hours PRN  albuterol/ipratropium for Nebulization 3 milliLiter(s) Nebulizer every 6 hours PRN  amLODIPine   Tablet 5 milliGRAM(s) Oral daily  enoxaparin Injectable 40 milliGRAM(s) SubCutaneous every 24 hours  LORazepam   Injectable 2 milliGRAM(s) IV Push every 2 hours PRN  montelukast 10 milliGRAM(s) Oral daily      HEALTH ISSUES - PROBLEM Dx:  Seizure    Prophylactic measure    Asthma    HTN (hypertension)

## 2023-05-31 NOTE — DISCHARGE NOTE PROVIDER - NSDCMRMEDTOKEN_GEN_ALL_CORE_FT
montelukast 10 mg oral tablet: 1 tab(s) orally once a day   amLODIPine 5 mg oral tablet: 1 tab(s) orally once a day  montelukast 10 mg oral tablet: 1 tab(s) orally once a day

## 2023-05-31 NOTE — PROGRESS NOTE ADULT - PROBLEM SELECTOR PLAN 1
repots hx of seizures does not know if she is currently rx meds or last time she took AEDs  - vEEG  - CT head negative   - seizure precautions  - aspiration precautions   - Ativan for seizure> 2 min  - med rec required- pt does not know name of pharmacy

## 2023-05-31 NOTE — PROGRESS NOTE ADULT - SUBJECTIVE AND OBJECTIVE BOX
MEDICINE CONSULT SERVICE PROGRESS NOTE    SUBJECTIVE / INTERVAL HPI: Patient seen and examined at bedside.     VITAL SIGNS:  Vital Signs Last 24 Hrs  T(C): 36.4 (31 May 2023 05:44), Max: 36.7 (30 May 2023 10:27)  T(F): 97.5 (31 May 2023 05:44), Max: 98 (30 May 2023 10:27)  HR: 60 (31 May 2023 05:44) (60 - 65)  BP: 143/91 (31 May 2023 05:44) (143/91 - 190/111)  BP(mean): --  RR: 18 (31 May 2023 05:44) (18 - 20)  SpO2: 99% (31 May 2023 05:44) (96% - 99%)    Parameters below as of 31 May 2023 05:44  Patient On (Oxygen Delivery Method): room air        PHYSICAL EXAM:    General: WDWN  HEENT: NC/AT; PERRL, anicteric sclera; MMM  Neck: supple  Cardiovascular: +S1/S2, RRR  Respiratory: CTA B/L; no W/R/R  Gastrointestinal: soft, NT/ND; +BSx4  Extremities: WWP; no edema, clubbing or cyanosis  Vascular: 2+ radial, DP/PT pulses B/L  Neurological: AAOx3; no focal deficits    MEDICATIONS:  MEDICATIONS  (STANDING):  amLODIPine   Tablet 5 milliGRAM(s) Oral daily  enoxaparin Injectable 40 milliGRAM(s) SubCutaneous every 24 hours  montelukast 10 milliGRAM(s) Oral daily    MEDICATIONS  (PRN):  acetaminophen     Tablet .. 650 milliGRAM(s) Oral every 6 hours PRN Temp greater or equal to 38C (100.4F), Mild Pain (1 - 3)  albuterol/ipratropium for Nebulization 3 milliLiter(s) Nebulizer every 6 hours PRN Shortness of Breath and/or Wheezing  LORazepam   Injectable 2 milliGRAM(s) IV Push every 2 hours PRN Seizure Activity>2 minutes      ALLERGIES:  Allergies    No Known Allergies    Intolerances        LABS:                        14.4   4.65  )-----------( 200      ( 31 May 2023 05:30 )             44.0     05-31    141  |  104  |  20  ----------------------------<  98  3.4<L>   |  27  |  1.25    Ca    9.8      31 May 2023 05:30  Phos  4.8     05-31  Mg     2.0     05-31          CAPILLARY BLOOD GLUCOSE          RADIOLOGY & ADDITIONAL TESTS: Reviewed.   MEDICINE CONSULT SERVICE PROGRESS NOTE    SUBJECTIVE / INTERVAL HPI: Patient seen and examined at bedside. No complaints.     VITAL SIGNS:  Vital Signs Last 24 Hrs  T(C): 36.4 (31 May 2023 05:44), Max: 36.7 (30 May 2023 10:27)  T(F): 97.5 (31 May 2023 05:44), Max: 98 (30 May 2023 10:27)  HR: 60 (31 May 2023 05:44) (60 - 65)  BP: 143/91 (31 May 2023 05:44) (143/91 - 190/111)  BP(mean): --  RR: 18 (31 May 2023 05:44) (18 - 20)  SpO2: 99% (31 May 2023 05:44) (96% - 99%)    Parameters below as of 31 May 2023 05:44  Patient On (Oxygen Delivery Method): room air        PHYSICAL EXAM:  Constitutional: WDWN resting comfortably in bed; NAD. On EEG.  Eyes: PERRL, anicteric sclera  ENT: Poor dentition, dry MM  Neck: supple; no JVD   Respiratory: CTA B/L; no W/R/R, no retractions  Cardiac: +S1/S2; RRR; no M/R/G  Gastrointestinal: abdomen soft, NT/ND; no rebound or guarding; +BSx4  Extremities: WWP, no clubbing or cyanosis; no peripheral edema  Musculoskeletal: NROM x4  Vascular: 2+ radial, DP/PT pulses B/L  Neurologic: AAOx3    MEDICATIONS:  MEDICATIONS  (STANDING):  amLODIPine   Tablet 5 milliGRAM(s) Oral daily  enoxaparin Injectable 40 milliGRAM(s) SubCutaneous every 24 hours  montelukast 10 milliGRAM(s) Oral daily    MEDICATIONS  (PRN):  acetaminophen     Tablet .. 650 milliGRAM(s) Oral every 6 hours PRN Temp greater or equal to 38C (100.4F), Mild Pain (1 - 3)  albuterol/ipratropium for Nebulization 3 milliLiter(s) Nebulizer every 6 hours PRN Shortness of Breath and/or Wheezing  LORazepam   Injectable 2 milliGRAM(s) IV Push every 2 hours PRN Seizure Activity>2 minutes      ALLERGIES:  Allergies    No Known Allergies    Intolerances        LABS:                        14.4   4.65  )-----------( 200      ( 31 May 2023 05:30 )             44.0     05-31    141  |  104  |  20  ----------------------------<  98  3.4<L>   |  27  |  1.25    Ca    9.8      31 May 2023 05:30  Phos  4.8     05-31  Mg     2.0     05-31          CAPILLARY BLOOD GLUCOSE          RADIOLOGY & ADDITIONAL TESTS: Reviewed.

## 2023-05-31 NOTE — DISCHARGE NOTE NURSING/CASE MANAGEMENT/SOCIAL WORK - PATIENT PORTAL LINK FT
You can access the FollowMyHealth Patient Portal offered by [facility] by registering at the following website: [email or web address]. By joining [employer]’s FollowMyHealth portal, you will also be able to view your health information using other applications (apps) compatible with our system.

## 2023-06-01 VITALS
TEMPERATURE: 97 F | DIASTOLIC BLOOD PRESSURE: 97 MMHG | SYSTOLIC BLOOD PRESSURE: 128 MMHG | RESPIRATION RATE: 18 BRPM | HEART RATE: 82 BPM | OXYGEN SATURATION: 98 %

## 2023-06-01 PROBLEM — Z00.00 ENCOUNTER FOR PREVENTIVE HEALTH EXAMINATION: Status: ACTIVE | Noted: 2023-06-01

## 2023-06-01 LAB
ANION GAP SERPL CALC-SCNC: 10 MMOL/L — SIGNIFICANT CHANGE UP (ref 5–17)
BUN SERPL-MCNC: 17 MG/DL — SIGNIFICANT CHANGE UP (ref 7–23)
CALCIUM SERPL-MCNC: 9.5 MG/DL — SIGNIFICANT CHANGE UP (ref 8.4–10.5)
CHLORIDE SERPL-SCNC: 104 MMOL/L — SIGNIFICANT CHANGE UP (ref 96–108)
CO2 SERPL-SCNC: 28 MMOL/L — SIGNIFICANT CHANGE UP (ref 22–31)
CREAT SERPL-MCNC: 1.13 MG/DL — SIGNIFICANT CHANGE UP (ref 0.5–1.3)
EGFR: 57 ML/MIN/1.73M2 — LOW
GLUCOSE SERPL-MCNC: 96 MG/DL — SIGNIFICANT CHANGE UP (ref 70–99)
HCT VFR BLD CALC: 42.9 % — SIGNIFICANT CHANGE UP (ref 34.5–45)
HGB BLD-MCNC: 13.9 G/DL — SIGNIFICANT CHANGE UP (ref 11.5–15.5)
MAGNESIUM SERPL-MCNC: 2 MG/DL — SIGNIFICANT CHANGE UP (ref 1.6–2.6)
MCHC RBC-ENTMCNC: 28.5 PG — SIGNIFICANT CHANGE UP (ref 27–34)
MCHC RBC-ENTMCNC: 32.4 GM/DL — SIGNIFICANT CHANGE UP (ref 32–36)
MCV RBC AUTO: 87.9 FL — SIGNIFICANT CHANGE UP (ref 80–100)
NRBC # BLD: 0 /100 WBCS — SIGNIFICANT CHANGE UP (ref 0–0)
PHOSPHATE SERPL-MCNC: 4.5 MG/DL — SIGNIFICANT CHANGE UP (ref 2.5–4.5)
PLATELET # BLD AUTO: 195 K/UL — SIGNIFICANT CHANGE UP (ref 150–400)
POTASSIUM SERPL-MCNC: 3.6 MMOL/L — SIGNIFICANT CHANGE UP (ref 3.5–5.3)
POTASSIUM SERPL-SCNC: 3.6 MMOL/L — SIGNIFICANT CHANGE UP (ref 3.5–5.3)
RBC # BLD: 4.88 M/UL — SIGNIFICANT CHANGE UP (ref 3.8–5.2)
RBC # FLD: 13.4 % — SIGNIFICANT CHANGE UP (ref 10.3–14.5)
SODIUM SERPL-SCNC: 142 MMOL/L — SIGNIFICANT CHANGE UP (ref 135–145)
WBC # BLD: 4.59 K/UL — SIGNIFICANT CHANGE UP (ref 3.8–10.5)
WBC # FLD AUTO: 4.59 K/UL — SIGNIFICANT CHANGE UP (ref 3.8–10.5)

## 2023-06-01 PROCEDURE — 99232 SBSQ HOSP IP/OBS MODERATE 35: CPT

## 2023-06-01 PROCEDURE — 95716 VEEG EA 12-26HR CONT MNTR: CPT

## 2023-06-01 PROCEDURE — 86703 HIV-1/HIV-2 1 RESULT ANTBDY: CPT

## 2023-06-01 PROCEDURE — 83605 ASSAY OF LACTIC ACID: CPT

## 2023-06-01 PROCEDURE — 82962 GLUCOSE BLOOD TEST: CPT

## 2023-06-01 PROCEDURE — 85025 COMPLETE CBC W/AUTO DIFF WBC: CPT

## 2023-06-01 PROCEDURE — 76377 3D RENDER W/INTRP POSTPROCES: CPT

## 2023-06-01 PROCEDURE — 94640 AIRWAY INHALATION TREATMENT: CPT

## 2023-06-01 PROCEDURE — 83735 ASSAY OF MAGNESIUM: CPT

## 2023-06-01 PROCEDURE — 84100 ASSAY OF PHOSPHORUS: CPT

## 2023-06-01 PROCEDURE — 85027 COMPLETE CBC AUTOMATED: CPT

## 2023-06-01 PROCEDURE — 95700 EEG CONT REC W/VID EEG TECH: CPT

## 2023-06-01 PROCEDURE — 80053 COMPREHEN METABOLIC PANEL: CPT

## 2023-06-01 PROCEDURE — 70551 MRI BRAIN STEM W/O DYE: CPT

## 2023-06-01 PROCEDURE — 80048 BASIC METABOLIC PNL TOTAL CA: CPT

## 2023-06-01 PROCEDURE — 84443 ASSAY THYROID STIM HORMONE: CPT

## 2023-06-01 PROCEDURE — 99285 EMERGENCY DEPT VISIT HI MDM: CPT

## 2023-06-01 PROCEDURE — 36415 COLL VENOUS BLD VENIPUNCTURE: CPT

## 2023-06-01 PROCEDURE — 70450 CT HEAD/BRAIN W/O DYE: CPT

## 2023-06-01 PROCEDURE — 95713 VEEG 2-12 HR CONT MNTR: CPT

## 2023-06-01 RX ORDER — AMLODIPINE BESYLATE 2.5 MG/1
1 TABLET ORAL
Qty: 30 | Refills: 0
Start: 2023-06-01

## 2023-06-01 RX ORDER — MONTELUKAST SODIUM 10 MG/1
1 TABLET ORAL
Qty: 30 | Refills: 0
Start: 2023-06-01 | End: 2025-08-18

## 2023-06-01 RX ORDER — AMLODIPINE BESYLATE 10 MG/1
1 TABLET ORAL
Qty: 30 | Refills: 0
Start: 2023-06-01 | End: 2025-08-18

## 2023-06-01 RX ORDER — MONTELUKAST 4 MG/1
1 TABLET, CHEWABLE ORAL
Qty: 30 | Refills: 0
Start: 2023-06-01 | End: 2023-06-30

## 2023-06-01 RX ORDER — AMLODIPINE BESYLATE 2.5 MG/1
1 TABLET ORAL
Qty: 30 | Refills: 0
Start: 2023-06-01 | End: 2023-06-30

## 2023-06-01 RX ADMIN — MONTELUKAST 10 MILLIGRAM(S): 4 TABLET, CHEWABLE ORAL at 11:18

## 2023-06-01 RX ADMIN — AMLODIPINE BESYLATE 5 MILLIGRAM(S): 2.5 TABLET ORAL at 06:14

## 2023-06-01 NOTE — PROGRESS NOTE ADULT - ATTENDING COMMENTS
58 YO F with PMHx HTN, asthma, seizure disorder BIBEMS to [facility] after witnessed GTC seizure admitted to epilepsy service for further monitoring. Internal medicine consulted on 5/30 for management of elevated blood pressures and asthma, current active smoker.    - pt seen and examined with Dr. Milian   had breakfast and ambulatory.  bp read last night around 9 pm was 170 systolic, lower on repeat without intervention., asymptomatic.  good air entry bilaterally.     BP: 148/90 (01 Jun 2023 05:01) (144/94 - 174/113)        HTN - no evidence of edema, would continue with once daily monotherapy with Amlodipine 5 mg daily   she would benefit from follow up ( establish care ) as out patient.   ekg 5/29- sinus latasha at 58 bpm.     borderline low -hypokalemia -  encouraged fruits     - asthma by history -stable -  can give montelukast 10 mg per day- she declined inhaler     - GFR is around 50 - would be considered as CKD stage 3 -  could be related to hypertension.    seizure management as per primary team.  MRI brain reviewed :No acute ischemia. Minimum small vessel ischemic and lacunar   disease. No definite hippocampal atrophy or signal abnormality."     - smoking cessation counselled- not interested, decline offer of nicotine.    thank you for allowing medicine to participate in the care, dw Dr. Milian.  she requested prescription be sent to pharmacy at [address] [place] [address] and [facility] ( do not remember the name )   no pmd, would benefit from establishing pmd . 58 YO F with PMHx HTN, asthma, seizure disorder BIBEMS to [facility] after witnessed GTC seizure admitted to epilepsy service for further monitoring. Internal medicine consulted on 5/30 for management of elevated blood pressures and asthma, current active smoker.    - pt seen and examined with Dr. Milian   had breakfast and ambulatory.  bp read last night around 9 pm was 170 systolic, lower on repeat without intervention., asymptomatic.  good air entry bilaterally.     BP: 148/90 (01 Jun 2023 05:01) (144/94 - 174/113)        HTN - no evidence of edema, would continue with once daily monotherapy with Amlodipine 5 mg daily   she would benefit from follow up ( establish care ) as out patient.   ekg 5/29- sinus latasha at 58 bpm.   borderline low -hypokalemia -  If Blood pressure remain High tomorrow around 150- would consider adding Lisinopril low dose 5 mg per day ( would help with Hypokalemia as well ) ( she has low GFR and ACEI would be renal protective )    - asthma by history -stable -  can give montelukast 10 mg per day- she declined inhaler     - GFR is around 50 - would be considered as CKD stage 3 -  could be related to hypertension.    seizure management as per primary team.  MRI brain reviewed :No acute ischemia. Minimum small vessel ischemic and lacunar   disease. No definite hippocampal atrophy or signal abnormality."     - smoking cessation counselled- not interested, decline offer of nicotine.    thank you for allowing medicine to participate in the care, dw Dr. Milian.  she requested prescription be sent to pharmacy at [address] [place] [address] and [facility] ( do not remember the name )   no pmd, would benefit from establishing pmd .

## 2023-06-01 NOTE — PROGRESS NOTE ADULT - SUBJECTIVE AND OBJECTIVE BOX
MEDICINE CONSULT SERVICE PROGRESS NOTE    SUBJECTIVE / INTERVAL HPI: Patient seen and examined at bedside.     VITAL SIGNS:  Vital Signs Last 24 Hrs  T(C): 36.7 (01 Jun 2023 05:01), Max: 36.7 (31 May 2023 11:32)  T(F): 98 (01 Jun 2023 05:01), Max: 98 (31 May 2023 11:32)  HR: 62 (01 Jun 2023 05:01) (62 - 80)  BP: 148/90 (01 Jun 2023 05:01) (144/94 - 174/113)  BP(mean): --  RR: 18 (01 Jun 2023 05:01) (18 - 20)  SpO2: 100% (01 Jun 2023 05:01) (98% - 100%)    Parameters below as of 01 Jun 2023 05:01  Patient On (Oxygen Delivery Method): room air        PHYSICAL EXAM:    General: WDWN  HEENT: NC/AT; PERRL, anicteric sclera; MMM  Neck: supple  Cardiovascular: +S1/S2, RRR  Respiratory: CTA B/L; no W/R/R  Gastrointestinal: soft, NT/ND; +BSx4  Extremities: WWP; no edema, clubbing or cyanosis  Vascular: 2+ radial, DP/PT pulses B/L  Neurological: AAOx3; no focal deficits    MEDICATIONS:  MEDICATIONS  (STANDING):  amLODIPine   Tablet 5 milliGRAM(s) Oral daily  enoxaparin Injectable 40 milliGRAM(s) SubCutaneous every 24 hours  montelukast 10 milliGRAM(s) Oral daily    MEDICATIONS  (PRN):  acetaminophen     Tablet .. 650 milliGRAM(s) Oral every 6 hours PRN Temp greater or equal to 38C (100.4F), Mild Pain (1 - 3)  albuterol/ipratropium for Nebulization 3 milliLiter(s) Nebulizer every 6 hours PRN Shortness of Breath and/or Wheezing  LORazepam   Injectable 2 milliGRAM(s) IV Push every 2 hours PRN Seizure Activity>2 minutes      ALLERGIES:  Allergies    No Known Allergies    Intolerances        LABS:                        13.9   4.59  )-----------( 195      ( 01 Jun 2023 05:30 )             42.9     06-01    142  |  104  |  17  ----------------------------<  96  3.6   |  28  |  1.13    Ca    9.5      01 Jun 2023 05:30  Phos  4.5     06-01  Mg     2.0     06-01          CAPILLARY BLOOD GLUCOSE      POCT Blood Glucose.: 123 mg/dL (31 May 2023 12:37)      RADIOLOGY & ADDITIONAL TESTS: Reviewed.   MEDICINE CONSULT SERVICE PROGRESS NOTE    SUBJECTIVE / INTERVAL HPI: Patient seen and examined at bedside. Feels well without any complaints. Eating breakfast.    VITAL SIGNS:  Vital Signs Last 24 Hrs  T(C): 36.7 (01 Jun 2023 05:01), Max: 36.7 (31 May 2023 11:32)  T(F): 98 (01 Jun 2023 05:01), Max: 98 (31 May 2023 11:32)  HR: 62 (01 Jun 2023 05:01) (62 - 80)  BP: 148/90 (01 Jun 2023 05:01) (144/94 - 174/113)  BP(mean): --  RR: 18 (01 Jun 2023 05:01) (18 - 20)  SpO2: 100% (01 Jun 2023 05:01) (98% - 100%)    Parameters below as of 01 Jun 2023 05:01  Patient On (Oxygen Delivery Method): room air        PHYSICAL EXAM:  Constitutional: WDWN resting comfortably sitting up in bed.   Eyes: PERRL, anicteric sclera  ENT: Poor dentition, dry MM  Neck: supple; no JVD   Respiratory: CTA B/L; no W/R/R, no retractions  Cardiac: +S1/S2; RRR; no M/R/G  Gastrointestinal: abdomen soft, NT/ND; no rebound or guarding; +BSx4  Extremities: WWP, no clubbing or cyanosis; no peripheral edema  Musculoskeletal: NROM x4  Vascular: 2+ radial, DP/PT pulses B/L  Neurologic: AAOx3    MEDICATIONS:  MEDICATIONS  (STANDING):  amLODIPine   Tablet 5 milliGRAM(s) Oral daily  enoxaparin Injectable 40 milliGRAM(s) SubCutaneous every 24 hours  montelukast 10 milliGRAM(s) Oral daily    MEDICATIONS  (PRN):  acetaminophen     Tablet .. 650 milliGRAM(s) Oral every 6 hours PRN Temp greater or equal to 38C (100.4F), Mild Pain (1 - 3)  albuterol/ipratropium for Nebulization 3 milliLiter(s) Nebulizer every 6 hours PRN Shortness of Breath and/or Wheezing  LORazepam   Injectable 2 milliGRAM(s) IV Push every 2 hours PRN Seizure Activity>2 minutes      ALLERGIES:  Allergies    No Known Allergies    Intolerances        LABS:                        13.9   4.59  )-----------( 195      ( 01 Jun 2023 05:30 )             42.9     06-01    142  |  104  |  17  ----------------------------<  96  3.6   |  28  |  1.13    Ca    9.5      01 Jun 2023 05:30  Phos  4.5     06-01  Mg     2.0     06-01          CAPILLARY BLOOD GLUCOSE      POCT Blood Glucose.: 123 mg/dL (31 May 2023 12:37)      RADIOLOGY & ADDITIONAL TESTS: Reviewed.

## 2023-06-01 NOTE — PROGRESS NOTE ADULT - ASSESSMENT
56 YO F with PMHx HTN, asthma, seizure disorder BIBEMS to [facility] after witnessed GTC seizure admitted to epilepsy service for further monitoring. Internal medicine consulted on 5/30 for management of elevated blood pressures.    #HTN  sBPs ranging 160-180s over course of admission on arrival to . Pt appears to have documented PMHx HTN but cannot recall home medications or name of pharmacy. No medications on outpatient medication review and SureScripts. Remainder of VSS. Amlodipine 5mg Q24 started on 5/30. AM BPs improving with PM BP reading of sBP 180s, asymptomatic.  - Continue Amlodipine 5mg Q24   - For future prescriptions, pt would prefer scripts at pharmacy located on [address] [place] [address] (does not recall name)    #Asthma  Pt with hx of asthma, reports using albuterol PRN which she obtains from family members with asthma but she herself does have a prescription or regular PCP follow-up. Refuses smoking cessation. Refuses inhalers while admitted. Lungs clear on exam.   - Continue Montelukast Q24 (pt requesting pills/PO instead of inhaler)   - Pt counseled on smoking cessation and offered nicotine patches but refused    Recommendations are final after attending attestation.    **Incomplete Note**  **Incomplete Note**   58 YO F with PMHx HTN, asthma, seizure disorder BIBEMS to [facility] after witnessed GTC seizure admitted to epilepsy service for further monitoring. Internal medicine consulted on 5/30 for management of elevated blood pressures.    #HTN  sBPs ranging 160-180s over course of admission on arrival to . Pt appears to have documented PMHx HTN but cannot recall home medications or name of pharmacy. No medications on outpatient medication review and SureScripts. Remainder of VSS. Amlodipine 5mg Q24 started on 5/30. AM BPs improving with PM BP reading of sBP 180s, asymptomatic.  - Continue Amlodipine 5mg Q24   - For future prescriptions, pt would prefer scripts at pharmacy located on [address] [place] [address] (does not recall name)    #Asthma  Pt with hx of asthma, reports using albuterol PRN which she obtains from family members with asthma but she herself does have a prescription or regular PCP follow-up. Refuses smoking cessation. Refuses inhalers while admitted. Lungs clear on exam.   - Continue Montelukast Q24 (pt requesting pills/PO instead of inhaler)   - Pt counseled on smoking cessation and offered nicotine patches but refused    Recommendations are final after attending attestation.

## 2023-06-02 PROBLEM — J45.909 UNSPECIFIED ASTHMA, UNCOMPLICATED: Chronic | Status: ACTIVE | Noted: 2023-05-29

## 2023-06-02 PROBLEM — I10 ESSENTIAL (PRIMARY) HYPERTENSION: Chronic | Status: ACTIVE | Noted: 2023-05-29

## 2023-06-02 PROBLEM — R56.9 UNSPECIFIED CONVULSIONS: Chronic | Status: ACTIVE | Noted: 2023-05-29

## 2023-06-06 ENCOUNTER — EMERGENCY (EMERGENCY)
Facility: HOSPITAL | Age: 58
LOS: 1 days | Discharge: HOME | End: 2023-06-06
Attending: EMERGENCY MEDICINE | Admitting: EMERGENCY MEDICINE
Payer: MEDICAID

## 2023-06-06 VITALS
OXYGEN SATURATION: 100 % | SYSTOLIC BLOOD PRESSURE: 173 MMHG | TEMPERATURE: 98 F | DIASTOLIC BLOOD PRESSURE: 112 MMHG | HEART RATE: 88 BPM | HEIGHT: 62 IN | WEIGHT: 139.99 LBS | RESPIRATION RATE: 18 BRPM

## 2023-06-06 VITALS — OXYGEN SATURATION: 100 % | HEART RATE: 61 BPM | RESPIRATION RATE: 15 BRPM

## 2023-06-06 DIAGNOSIS — I10 ESSENTIAL (PRIMARY) HYPERTENSION: ICD-10-CM

## 2023-06-06 DIAGNOSIS — G40.909 EPILEPSY, UNSPECIFIED, NOT INTRACTABLE, WITHOUT STATUS EPILEPTICUS: ICD-10-CM

## 2023-06-06 DIAGNOSIS — F17.200 NICOTINE DEPENDENCE, UNSPECIFIED, UNCOMPLICATED: ICD-10-CM

## 2023-06-06 DIAGNOSIS — R06.02 SHORTNESS OF BREATH: ICD-10-CM

## 2023-06-06 DIAGNOSIS — R06.2 WHEEZING: ICD-10-CM

## 2023-06-06 DIAGNOSIS — Z87.09 PERSONAL HISTORY OF OTHER DISEASES OF THE RESPIRATORY SYSTEM: ICD-10-CM

## 2023-06-06 DIAGNOSIS — R25.1 TREMOR, UNSPECIFIED: ICD-10-CM

## 2023-06-06 DIAGNOSIS — R20.2 PARESTHESIA OF SKIN: ICD-10-CM

## 2023-06-06 DIAGNOSIS — Z20.822 CONTACT WITH AND (SUSPECTED) EXPOSURE TO COVID-19: ICD-10-CM

## 2023-06-06 LAB
ALBUMIN SERPL ELPH-MCNC: 3.3 G/DL — LOW (ref 3.4–5)
ALP SERPL-CCNC: 95 U/L — SIGNIFICANT CHANGE UP (ref 40–120)
ALT FLD-CCNC: 26 U/L — SIGNIFICANT CHANGE UP (ref 12–42)
ANION GAP SERPL CALC-SCNC: 10 MMOL/L — SIGNIFICANT CHANGE UP (ref 9–16)
AST SERPL-CCNC: 21 U/L — SIGNIFICANT CHANGE UP (ref 15–37)
BASOPHILS # BLD AUTO: 0.07 K/UL — SIGNIFICANT CHANGE UP (ref 0–0.2)
BASOPHILS NFR BLD AUTO: 1.3 % — SIGNIFICANT CHANGE UP (ref 0–2)
BILIRUB SERPL-MCNC: 0.7 MG/DL — SIGNIFICANT CHANGE UP (ref 0.2–1.2)
BUN SERPL-MCNC: 25 MG/DL — HIGH (ref 7–23)
CALCIUM SERPL-MCNC: 9.2 MG/DL — SIGNIFICANT CHANGE UP (ref 8.5–10.5)
CHLORIDE SERPL-SCNC: 108 MMOL/L — SIGNIFICANT CHANGE UP (ref 96–108)
CO2 SERPL-SCNC: 27 MMOL/L — SIGNIFICANT CHANGE UP (ref 22–31)
CREAT SERPL-MCNC: 1.05 MG/DL — SIGNIFICANT CHANGE UP (ref 0.5–1.3)
EGFR: 63 ML/MIN/1.73M2 — SIGNIFICANT CHANGE UP
EOSINOPHIL # BLD AUTO: 0.16 K/UL — SIGNIFICANT CHANGE UP (ref 0–0.5)
EOSINOPHIL NFR BLD AUTO: 2.9 % — SIGNIFICANT CHANGE UP (ref 0–6)
FLUAV H1 2009 PAND RNA SPEC QL NAA+PROBE: SIGNIFICANT CHANGE UP
FLUAV H1 RNA SPEC QL NAA+PROBE: SIGNIFICANT CHANGE UP
FLUAV H3 RNA SPEC QL NAA+PROBE: SIGNIFICANT CHANGE UP
FLUAV SUBTYP SPEC NAA+PROBE: SIGNIFICANT CHANGE UP
FLUBV RNA SPEC QL NAA+PROBE: SIGNIFICANT CHANGE UP
GLUCOSE SERPL-MCNC: 124 MG/DL — HIGH (ref 70–99)
HCT VFR BLD CALC: 37.2 % — SIGNIFICANT CHANGE UP (ref 34.5–45)
HGB BLD-MCNC: 12.3 G/DL — SIGNIFICANT CHANGE UP (ref 11.5–15.5)
IMM GRANULOCYTES NFR BLD AUTO: 0.2 % — SIGNIFICANT CHANGE UP (ref 0–0.9)
LYMPHOCYTES # BLD AUTO: 2.79 K/UL — SIGNIFICANT CHANGE UP (ref 1–3.3)
LYMPHOCYTES # BLD AUTO: 50.4 % — HIGH (ref 13–44)
MAGNESIUM SERPL-MCNC: 2 MG/DL — SIGNIFICANT CHANGE UP (ref 1.6–2.6)
MCHC RBC-ENTMCNC: 29 PG — SIGNIFICANT CHANGE UP (ref 27–34)
MCHC RBC-ENTMCNC: 33.1 GM/DL — SIGNIFICANT CHANGE UP (ref 32–36)
MCV RBC AUTO: 87.7 FL — SIGNIFICANT CHANGE UP (ref 80–100)
MONOCYTES # BLD AUTO: 0.38 K/UL — SIGNIFICANT CHANGE UP (ref 0–0.9)
MONOCYTES NFR BLD AUTO: 6.9 % — SIGNIFICANT CHANGE UP (ref 2–14)
NEUTROPHILS # BLD AUTO: 2.13 K/UL — SIGNIFICANT CHANGE UP (ref 1.8–7.4)
NEUTROPHILS NFR BLD AUTO: 38.3 % — LOW (ref 43–77)
NRBC # BLD: 0 /100 WBCS — SIGNIFICANT CHANGE UP (ref 0–0)
PCO2 BLDV: 28 MMHG — LOW (ref 39–42)
PH BLDV: 7.58 — HIGH (ref 7.32–7.43)
PLATELET # BLD AUTO: 203 K/UL — SIGNIFICANT CHANGE UP (ref 150–400)
PO2 BLDV: 103 MMHG — HIGH (ref 25–45)
POTASSIUM SERPL-MCNC: 3.8 MMOL/L — SIGNIFICANT CHANGE UP (ref 3.5–5.3)
POTASSIUM SERPL-SCNC: 3.8 MMOL/L — SIGNIFICANT CHANGE UP (ref 3.5–5.3)
PROT SERPL-MCNC: 7.1 G/DL — SIGNIFICANT CHANGE UP (ref 6.4–8.2)
RAPID RVP RESULT: SIGNIFICANT CHANGE UP
RBC # BLD: 4.24 M/UL — SIGNIFICANT CHANGE UP (ref 3.8–5.2)
RBC # FLD: 14 % — SIGNIFICANT CHANGE UP (ref 10.3–14.5)
SAO2 % BLDV: 99.7 % — HIGH (ref 67–88)
SARS-COV-2 RNA SPEC QL NAA+PROBE: SIGNIFICANT CHANGE UP
SODIUM SERPL-SCNC: 145 MMOL/L — SIGNIFICANT CHANGE UP (ref 132–145)
WBC # BLD: 5.54 K/UL — SIGNIFICANT CHANGE UP (ref 3.8–10.5)
WBC # FLD AUTO: 5.54 K/UL — SIGNIFICANT CHANGE UP (ref 3.8–10.5)

## 2023-06-06 PROCEDURE — 99285 EMERGENCY DEPT VISIT HI MDM: CPT

## 2023-06-06 PROCEDURE — 70450 CT HEAD/BRAIN W/O DYE: CPT | Mod: 26

## 2023-06-06 RX ORDER — IPRATROPIUM/ALBUTEROL SULFATE 18-103MCG
3 AEROSOL WITH ADAPTER (GRAM) INHALATION
Refills: 0 | Status: COMPLETED | OUTPATIENT
Start: 2023-06-06 | End: 2023-06-06

## 2023-06-06 RX ADMIN — Medication 3 MILLILITER(S): at 17:47

## 2023-06-06 RX ADMIN — Medication 3 MILLILITER(S): at 17:10

## 2023-06-06 RX ADMIN — Medication 3 MILLILITER(S): at 17:00

## 2023-06-06 NOTE — ED ADULT NURSE NOTE - NS ED PATIENT SAFETY CONCERN
----- Message from Desiree Sanchez MD sent at 3/29/2019 12:57 PM CDT -----  Normal fetal anatomy scan, please advise and make sure next visit set up. (LL pt)    Dseiree Sanchez M.D.  Obstetrics and Gynecology   No

## 2023-06-06 NOTE — ED PROVIDER NOTE - PATIENT PORTAL LINK FT
You can access the FollowMyHealth Patient Portal offered by [facility] by registering at the following website: [email or web address]. By joining [employer]’s FollowMyHealth portal, you will also be able to view your health information using other applications (apps) compatible with our system.

## 2023-06-06 NOTE — ED PROVIDER NOTE - PHYSICAL EXAMINATION
GENERAL: Awake, alert, NAD  HEENT: NC/AT, moist mucous membranes, PERRL, EOMI  LUNGS: CTAB, no wheezes or crackles, dyspneic  CARDIAC: RRR, no m/r/g  ABDOMEN: Soft, normal BS, non tender, non distended, no rebound, no guarding  BACK: No midline spinal tenderness, no CVA tenderness  EXT: No edema, no calf tenderness, 2+ DP pulses bilaterally, no deformities.  NEURO: A&Ox3. Moving all extremities. RUE tremor. R  strength 4/5, L 5/5. RLE drift compared to LLE. No facial droop, no slurred speech. Normal cerebellar exam.   SKIN: Warm and dry. No rash.  PSYCH: Anxious

## 2023-06-06 NOTE — ED PROVIDER NOTE - ATTENDING CONTRIBUTION TO CARE
I have seen the pt, reviewed all pertinent clinical data, and I agree with the documentation/care/plan executed by Dr. Ramirez. Reporting upper extremity trembling in setting of SOB/hyperventilation w/clear lung exam saturating 100% on RA w/no AMS or suspicion of post-ictal state. Unlikely to be having seizures given recent robust workup. No e/o infectious process, no anemia or electrolyte derangement. will d/c home given that sx are resolved and pt has neuro f/u already scheduled.

## 2023-06-06 NOTE — ED PROVIDER NOTE - NS ED ROS FT
CONST: no fevers, no chills  EYES: no pain, no vision changes  ENT: no sore throat, no ear pain, no change in hearing  CV: no chest pain, no leg swelling  RESP: +shortness of breath, no cough  ABD: no abdominal pain, no nausea, no vomiting, no diarrhea  : no dysuria, no flank pain, no hematuria  MSK: no back pain, no extremity pain  NEURO: no headache, +RUE tremor  HEME: no easy bleeding  SKIN:  no rash

## 2023-06-06 NOTE — ED PROVIDER NOTE - OBJECTIVE STATEMENT
57 year old F with PMH asthma, seizure disorder, HTN presenting with RUE tremulousness beginning at 330pm. Also arrives short of breath. Patient states her last seizure was on week ago when she was hospitalized. Admission 5/29-6/1 included EEG, CT and MRI, all of which were unremarkable. Patient is a current smoker. Denies fevers/chills, HA, CP, wheezing, slurred speech, facial droop, abnormal gait.

## 2023-06-06 NOTE — ED ADULT NURSE NOTE - OBJECTIVE STATEMENT
Pt arrives with a lot of wheezing, placed on pulse ox and cardiac monitor, NSR. O2, % room air. Pt tremulous in right arm only. When left unstimulated or not looking at pt both wheezing and tremors will stop. Pt states she lives at a shelter and does not take any seizure medication.

## 2023-06-06 NOTE — ED ADULT TRIAGE NOTE - CHIEF COMPLAINT QUOTE
here for seizure. Pt is shaking in triage and answering questions while being alert and awake. Pt was just d/c from [facility] on 6/1/23- FS in the field 129.

## 2023-06-06 NOTE — ED ADULT NURSE NOTE - CHIEF COMPLAINT QUOTE
here for seizure. Pt is shaking in triage and answering questions while being alert and awake. Pt was just d/c from [facility] on 6/1/23- FS in the field 129.

## 2023-06-06 NOTE — ED PROVIDER NOTE - CLINICAL SUMMARY MEDICAL DECISION MAKING FREE TEXT BOX
57 year old F with PMH asthma, seizure disorder, HTN presenting with RUE tremulousness beginning at 330pm and shortness of breath. 57 year old F with PMH asthma, seizure disorder, HTN presenting with RUE tremulousness beginning at 330pm and shortness of breath.      On re-exam, neuro intact, negative head CT, labs unremarkable, found sleeping and calm in stretcher, when woken pt's sx had resolved. Given robust recent workup, suitable for d/c home with return precautions and planned f/u with neuro.

## 2023-06-06 NOTE — ED ADULT NURSE NOTE - TEMPLATE
Plan:  Continue same dose of methadone as prescribed for chronic pain  Discontinue morphine 15 mg--rash  Change back to dilaudid 8 mg max of four times daily as needed  Follow up in 2 months or sooner if needed  Regular exercise and attention to emotional health and diet remain the most effective ways to treat chronic pain of all kinds  You may contact me with questions or concerns through [facility]

## 2023-06-06 NOTE — ED ADULT NURSE NOTE - NSFALLUNIVINTERV_ED_ALL_ED
Bed/Stretcher in lowest position, wheels locked, appropriate side rails in place/Call bell, personal items and telephone in reach/Instruct patient to call for assistance before getting out of bed/chair/stretcher/Non-slip footwear applied when patient is off stretcher/[place] to call system/Physically safe environment - no spills, clutter or unnecessary equipment/Purposeful proactive rounding/Room/bathroom lighting operational, light cord in reach

## 2023-06-06 NOTE — ED PROVIDER NOTE - NSFOLLOWUPINSTRUCTIONS_ED_ALL_ED_FT
Paresthesia  Paresthesia is an abnormal burning or prickling sensation. It is usually felt in the hands, arms, legs, or feet. However, it may occur in any part of the body. Usually, paresthesia is not painful. It may feel like:  Tingling or numbness.  Buzzing.  Itching.  Paresthesia may occur without any clear cause, or it may be caused by:  Breathing too quickly (hyperventilation).  Pressure on a nerve.  An underlying medical condition.  Side effects of a medicine.  Nutritional deficiencies.  Exposure to toxic chemicals.  Most people experience temporary (transient) paresthesia at some time in their lives. For some people, it may be long-lasting (chronic) because of an underlying medical condition. If you have paresthesia that lasts a long time, you need to be evaluated by your health care provider.    Follow these instructions at home:  Nutrition    A plate with examples of foods in a healthy diet.  Eat a healthy diet. This includes:  Eating foods that are high in fiber, such as beans, whole grains, and fresh fruits and vegetables.  Limiting foods that are high in fat and processed sugars, such as fried or sweet foods.  Alcohol use    A sign showing that a person should not drink alcohol.  Avoid or limit alcohol. Too much alcohol can cause a vitamin B deficiency, and vitamin B is needed for healthy nerves.  Do not drink alcohol if:  Your health care provider tells you not to drink.  You are pregnant, may be pregnant, or are planning to become pregnant.  If you drink alcohol:  Limit how much you have to:  0–1 drink a day for women.  0–2 drinks a day for men.  Know how much alcohol is in your drink. In the [place] one drink equals one 12 oz bottle of beer (355 mL), one 5 oz glass of wine (148 mL), or one 1½ oz glass of hard liquor (44 mL).  General instructions    Take over-the-counter and prescription medicines only as told by your health care provider.  Do not use any products that contain nicotine or tobacco. These products include cigarettes, chewing tobacco, and vaping devices, such as e-cigarettes. If you need help quitting, ask your health care provider.  If you have diabetes, work closely with your health care provider to keep your blood sugar under control.  If you have numbness in your feet:  Check every day for signs of injury or infection. Watch for redness, warmth, and swelling.  Wear padded socks and comfortable shoes. These help protect your feet.  Keep all follow-up visits. This is important.  Contact a health care provider if you:  Have paresthesia that gets worse or does not go away.  Have numbness after an injury.  Have a burning or prickling feeling that gets worse when you walk.  Have pain, cramps, or dizziness, or you faint.  Develop a rash.  Get help right away if you:  Feel muscle weakness.  Develop new weakness in an arm or leg.  Have trouble walking or moving.  Have problems with speech, understanding, or vision.  Feel confused.  Cannot control your bladder or bowel movements.  These symptoms may be an emergency. Get help right away. Call 911.  Do not wait to see if the symptoms will go away.  Do not drive yourself to the hospital.  Summary  Paresthesia is an abnormal burning or prickling sensation that is usually felt in the hands, arms, legs, or feet. It may also occur in other parts of the body.  Paresthesia may occur without any clear cause, or it may be caused by breathing too quickly (hyperventilation), pressure on a nerve, an underlying medical condition, side effects of a medicine, nutritional deficiencies, or exposure to toxic chemicals.  If you have paresthesia that lasts a long time, you need to be evaluated by your health care provider.

## 2023-06-06 NOTE — ED PROVIDER NOTE - PROGRESS NOTE DETAILS
Alisa Ramirez PGY-3: Reassessed patient. While sleeping, no tremor noted, no coughing. When woken, patient began tremoring and coughing again. Lungs CTA.

## 2023-06-08 ENCOUNTER — APPOINTMENT (OUTPATIENT)
Age: 58
End: 2023-06-08

## 2024-05-22 NOTE — H&P ADULT - CLICK TO LAUNCH ORM
Fax received from [facility].  Forms placed on doctor's desk for review and signing.   .

## 2025-07-20 ENCOUNTER — EMERGENCY (EMERGENCY)
Facility: HOSPITAL | Age: 60
LOS: 1 days | End: 2025-07-20
Attending: EMERGENCY MEDICINE | Admitting: EMERGENCY MEDICINE
Payer: MEDICAID

## 2025-07-20 VITALS
SYSTOLIC BLOOD PRESSURE: 182 MMHG | TEMPERATURE: 98 F | DIASTOLIC BLOOD PRESSURE: 105 MMHG | HEART RATE: 85 BPM | RESPIRATION RATE: 15 BRPM | OXYGEN SATURATION: 98 %

## 2025-07-20 PROCEDURE — 99283 EMERGENCY DEPT VISIT LOW MDM: CPT

## 2025-07-20 RX ORDER — AMLODIPINE BESYLATE 10 MG/1
5 TABLET ORAL ONCE
Refills: 0 | Status: COMPLETED | OUTPATIENT
Start: 2025-07-20 | End: 2025-07-20

## 2025-07-20 RX ORDER — AMLODIPINE BESYLATE 10 MG/1
5 TABLET ORAL ONCE
Refills: 0 | Status: DISCONTINUED | OUTPATIENT
Start: 2025-07-20 | End: 2025-07-20

## 2025-07-20 RX ORDER — PERMETHRIN 50 MG/G
1 CREAM TOPICAL ONCE
Refills: 0 | Status: DISCONTINUED | OUTPATIENT
Start: 2025-07-20 | End: 2025-07-23

## 2025-07-20 RX ADMIN — AMLODIPINE BESYLATE 5 MILLIGRAM(S): 10 TABLET ORAL at 11:03

## 2025-07-20 NOTE — ED PROVIDER NOTE - NSFOLLOWUPINSTRUCTIONS_ED_ALL_ED_FT
YOU NEED TO SEE A NEUROLOGIST TO DECIDE WHICH MEDICINES YOU NEED FOR SEIZURES.  YOU CAN BE SEEN AT [facility] OR CALL THE PHONE NUMBER GIVEN ON THESE PAPERS FOR A DOCTOR.     IT IS IMPORTANT TO TAKE YOUR BLOOD PRESSURE MEDICINE TO PREVENT HEART ATTACK, KIDNEY FAILURE AND STROKES.    [address] [address]  [phone]    Scabies, Adult  Red rash on a person's hands, with a close-up of the mite that causes it.  Scabies is a skin condition that happens when very small insects called mites get under your skin. This causes severe itchiness and a rash that looks like pimples. Scabies is contagious. This means it can spread easily from person to person. If you get scabies, the people you live with may get it too.    With the right treatment, symptoms often go away in 2–4 weeks. In most cases, scabies does not cause lasting problems.    What are the causes?  Scabies is caused by tiny mites (Sarcoptes scabiei) that can only be seen with a microscope. The mites get into the top layer of your skin and lay eggs. This is called an infestation. You may get scabies if:  You have close contact with someone who has scabies.  You come in contact with items that have the mites on them. These may include towels, bedding, or clothes.  What increases the risk?  You may be more likely to get scabies if:  You live in a nursing home or extended care facility.  You spend time in a place where a lot of people live close together, such as a shelter or half-way.  You have sex with a partner who has scabies.  You care for others who are at risk for scabies.  What are the signs or symptoms?  Symptoms of scabies include:  A rash that looks like pimples. It may include tiny red bumps or blisters. It is often found in the skinfolds or on the hands, wrists, elbows, armpits, chest, waist, groin, or buttocks.  Severe itchiness. This is often worse at night.  Skin irritation. This can include scaly patches or sores.  The bumps from scabies may form a line (burrow) on the skin. The line may look thin, crooked, and grayish-white or skin colored.    How is this diagnosed?  Scabies may be diagnosed based on a physical exam of your skin. You may also have a skin test done. A sample of your skin may be taken (skin scraping) and looked at under a microscope for signs of mites.    How is this treated?  Scabies may be treated with:  Medicated creams or lotions to kill the mites. The cream or lotion is spread on your whole body and left for a few hours. In most cases, one treatment is enough to kill all the mites. In severe cases, the treatment may need to be done more than once.  Medicated cream to help with the itching.  Medicines taken by mouth (orally). These may help:  Relieve itching.  Reduce the swelling and redness.  Kill the mites. This treatment may be used in severe cases.  Follow these instructions at home:  Medicines    Take or apply over-the-counter and prescription medicines only as told by your health care provider.  Apply medicated cream or lotion as told by your provider.  Do not wash off the medicated cream or lotion until enough time has passed or as told by your provider.  Skin care    Try not to scratch or pick at the affected areas of your skin.  Keep your fingernails closely trimmed. This can help reduce injury from scratching.  Take cool baths or apply cool, wet cloths to your skin. This can help reduce itching.  General instructions    Clean all items that you touched in the 3 days before you were diagnosed. This includes bedding, clothes, towels, and furniture. Do this on the same day that you start treatment.  Dry-clean items or use hot water to wash them. Dry them on the hot dry cycle.  Place items that cannot be washed into closed, airtight plastic bags for at least 3 days. The mites cannot live for more than 3 days away from human skin.  Vacuum your furniture and mattresses.  Make sure that other people who may have been infested see a provider.  Where to find more information  Centers for Disease Control and Prevention (CDC): cdc.gov  Contact a health care provider if:  You have itching that does not go away after 4 weeks of treatment.  You keep getting new bumps or burrows.  You have redness, swelling, or pain near your rash after treatment.  You have fluid, blood, or pus coming from your rash.  You get thick crusts or scaly patches over large areas of your skin.  You have a fever.  This information is not intended to replace advice given to you by your health care provider. Make sure you discuss any questions you have with your health care provider.    Document Revised: 09/25/2023 Document Reviewed: 09/25/2023  Elsevier Patient Education © 2025 [employer]

## 2025-07-20 NOTE — ED PROVIDER NOTE - OBJECTIVE STATEMENT
60 yo F PMH sz, asthma, htn who has not been taking her meds reporting itching.  Patient is not sure of any of her meds.  She is undomiciled.  No cp, sob, abd pain.  Review of chart shows meds as amlodipine and montelukast.  She was admitted for sz in 2023 but no meds were prescribed. Video EEG did not show any sz activity.  MRI without acute findings.  She was supposed to follow up with a Neurologist.

## 2025-07-20 NOTE — ED PROVIDER NOTE - PATIENT PORTAL LINK FT
You can access the FollowMyHealth Patient Portal offered by [facility] by registering at the following website: [email or web address]. By joining "[employer]’s FollowMyHealth portal, you will also be able to view your health information using other applications (apps) compatible with our system.

## 2025-07-20 NOTE — ED PROVIDER NOTE - CLINICAL SUMMARY MEDICAL DECISION MAKING FREE TEXT BOX
60 yo F PMH sz, asthma, htn who has not been taking her meds reporting itching.  Patient is not sure of any of her meds.  She is undomiciled.  No cp, sob, abd pain.  Review of chart shows meds as amlodipine and montelukast.  She was admitted for sz in 2023 but no meds were prescribed. Video EEG did not show any sz activity.  MRI without acute findings.  She was supposed to follow up with a Neurologist.    Rash c/w scabies.  will give permethrin cream.  Patient has not been taking her bp meds.  per chart she was on amlodipine which will be given.  she does not knw her seizure meds and has not taken them for some time--her history was similar when she was seen for sz in 2023. At that time, despite Neuro w/u inpt no meds were given and outpatient f/u recommended. will give patient f/u information.

## 2025-07-20 NOTE — ED PROVIDER NOTE - PHYSICAL EXAMINATION
General:  Well appearing, no distress  HEENT:  No conjunctival injection, neck supple, no congestion   Chest:  Non-tender, no crepitance  Lungs:  Clear to auscultation bilaterally   Heart:  s1s2 normal, no murmur  Abdomen:  soft, non-tender, non-distended  :  Deferred  Rectal:  Deferred  Skin:  Diffuse papular rash with excoriations c/w scabies.  No areas of secondary swelling or redness.   Extremities: No edema, normal perfusion, no joint swelling or tenderness  Neuro:  Alert, conversant, motor/sensory grossly intact

## 2025-07-23 DIAGNOSIS — J45.909 UNSPECIFIED ASTHMA, UNCOMPLICATED: ICD-10-CM

## 2025-07-23 DIAGNOSIS — I10 ESSENTIAL (PRIMARY) HYPERTENSION: ICD-10-CM

## 2025-07-23 DIAGNOSIS — Z59.00 HOMELESSNESS UNSPECIFIED: ICD-10-CM

## 2025-07-23 DIAGNOSIS — R21 RASH AND OTHER NONSPECIFIC SKIN ERUPTION: ICD-10-CM

## 2025-07-23 DIAGNOSIS — B86 SCABIES: ICD-10-CM

## 2025-07-23 SDOH — ECONOMIC STABILITY - HOUSING INSECURITY: HOMELESSNESS UNSPECIFIED: Z59.00
